# Patient Record
Sex: FEMALE | Race: WHITE
[De-identification: names, ages, dates, MRNs, and addresses within clinical notes are randomized per-mention and may not be internally consistent; named-entity substitution may affect disease eponyms.]

---

## 2018-03-07 ENCOUNTER — HOSPITAL ENCOUNTER (EMERGENCY)
Dept: HOSPITAL 25 - ED | Age: 83
Discharge: ANOTHER HOSPITAL | End: 2018-03-07
Payer: MEDICARE

## 2018-03-07 VITALS — SYSTOLIC BLOOD PRESSURE: 166 MMHG | DIASTOLIC BLOOD PRESSURE: 100 MMHG

## 2018-03-07 DIAGNOSIS — K92.2: Primary | ICD-10-CM

## 2018-03-07 DIAGNOSIS — Z87.19: ICD-10-CM

## 2018-03-07 DIAGNOSIS — Z88.2: ICD-10-CM

## 2018-03-07 LAB
BASOPHILS # BLD AUTO: 0.1 10^3/UL (ref 0–0.2)
EOSINOPHIL # BLD AUTO: 0.2 10^3/UL (ref 0–0.6)
HCT VFR BLD AUTO: 39 % (ref 35–47)
HGB BLD-MCNC: 13.2 G/DL (ref 12–16)
INR PPP/BLD: 0.91 (ref 0.77–1.02)
LYMPHOCYTES # BLD AUTO: 1.9 10^3/UL (ref 1–4.8)
MCH RBC QN AUTO: 29 PG (ref 27–31)
MCHC RBC AUTO-ENTMCNC: 34 G/DL (ref 31–36)
MCV RBC AUTO: 86 FL (ref 80–97)
MONOCYTES # BLD AUTO: 0.8 10^3/UL (ref 0–0.8)
NEUTROPHILS # BLD AUTO: 5.5 10^3/UL (ref 1.5–7.7)
NRBC # BLD AUTO: 0 10^3/UL
NRBC BLD QL AUTO: 0.1
PLATELET # BLD AUTO: 246 10^3/UL (ref 150–450)
RBC # BLD AUTO: 4.52 10^6/UL (ref 4–5.4)
WBC # BLD AUTO: 8.4 10^3/UL (ref 3.5–10.8)

## 2018-03-07 PROCEDURE — 80053 COMPREHEN METABOLIC PANEL: CPT

## 2018-03-07 PROCEDURE — 85610 PROTHROMBIN TIME: CPT

## 2018-03-07 PROCEDURE — 85025 COMPLETE CBC W/AUTO DIFF WBC: CPT

## 2018-03-07 PROCEDURE — 36415 COLL VENOUS BLD VENIPUNCTURE: CPT

## 2018-03-07 PROCEDURE — 86901 BLOOD TYPING SEROLOGIC RH(D): CPT

## 2018-03-07 PROCEDURE — 96360 HYDRATION IV INFUSION INIT: CPT

## 2018-03-07 PROCEDURE — 99283 EMERGENCY DEPT VISIT LOW MDM: CPT

## 2018-03-07 PROCEDURE — 86900 BLOOD TYPING SEROLOGIC ABO: CPT

## 2018-03-07 PROCEDURE — 86850 RBC ANTIBODY SCREEN: CPT

## 2018-03-07 PROCEDURE — 96361 HYDRATE IV INFUSION ADD-ON: CPT

## 2018-03-07 NOTE — ED
[place] NGO Julia, scribed for Ismael De Jesus MD on 03/07/18 at 1914 .





GI/ HPI





- HPI Summary


HPI Summary: 





This patient is a 82 year old F presenting to [facility] accompanied by her daughter 

with a chief complaint of four bouts of rectal bleeding beginning at 17:45 3/7/

18. She states the first time was dark red blood with bright red blood since. 

Patient denies abdominal pain, dizziness, and changes in bowel habits. She had 

a colon resection in 2008 and has a history of diverticulitis. She is not on 

blood thinners.





- History of Current Complaint


Chief Complaint: EDGIBleed


Stated Complaint: RECTAL BLEEDING


Hx Obtained From: Patient


Onset/Duration: Started Hours Ago


Timing: Constant


Vaginal Bleeding Description: Bright Red


Pain Intensity: 0


Location of Pain: None


Associated Signs and Symptoms: Positive: Other: - no bowel changes.  Negative: 

Dizziness, Abdominal Pain





- Allergy/Home Medications


Allergies/Adverse Reactions: 


 Allergies











Allergy/AdvReac Type Severity Reaction Status Date / Time


 


Sulfa (Sulfonamide Allergy  See Comment Verified 03/07/18 19:18





Antibiotics)     











Home Medications: 


 Home Medications





L.acidoph,Paracasei, B.lactis [Probiotic] 1 each PO DAILY 03/07/18 [History 

Confirmed 03/07/18]


Multivitamins/Minerals TAB* [Theragran/minerals TAB*] 1 tab PO DAILY 03/07/18 [

History Confirmed 03/07/18]











PMH/Surg Hx/FS Hx/Imm Hx


Endocrine/Hematology History: 


   Denies: Hx Diabetes


Cardiovascular History: 


   Denies: Hx Hypertension, Hx Pacemaker/ICD


Sensory History: 


   Denies: Hx Hearing Aid


Psychiatric History: 


   Denies: Hx Panic Disorder





- Surgical History


Surgery Procedure, Year, and Place: RIGHT HIP.  CATARACTS.  RT LEG SURGERY WITH 

NADIRA AND 7 SCREWS


Infectious Disease History: No


Infectious Disease History: 


   Denies: Traveled Outside the [place] in Last 30 Days





- Family History


Known Family History: 


   Negative: Cardiac Disease, Diabetes





- Social History


Occupation: Retired





Review of Systems


Gastrointestinal: Other - rectal bleeding


Positive: Other - bowel changes.  Negative: Abdominal Pain


Neurological: Negative - dizziness


All Other Systems Reviewed And Are Negative: Yes





Physical Exam





- Summary


Physical Exam Summary: 





Appearance: Well appearing, no pain distress


Skin: warm, dry, reflects adequate perfusion


Head/face: normal


Eyes: EOMI, RUBY


ENT: normal


Neck: supple, non-tender


Respiratory: CTA, breath sounds present


Cardiovascular: RRR, pulses symmetrical 


Abdomen: non-tender, soft


Bowel: present


Musculoskeletal: normal, strength/ROM intact


Neuro: normal, sensory motor intact, A&Ox3


Rectal Exam: bright red blood per rectum, no rectal masses





Triage Information Reviewed: Yes


Vital Signs On Initial Exam: 


 Initial Vitals











Temp Pulse Resp BP Pulse Ox


 


 97.6 F   106   17   191/91   95 


 


 03/07/18 18:25  03/07/18 18:25  03/07/18 18:25  03/07/18 18:25  03/07/18 18:25











Vital Signs Reviewed: Yes





Diagnostics





- Vital Signs


 Vital Signs











  Temp Pulse Resp BP Pulse Ox


 


 03/07/18 18:25  97.6 F  106  17  191/91  95














- Laboratory


Lab Results: 


 Lab Results











  03/07/18 03/07/18 03/07/18 Range/Units





  19:27 19:27 19:27 


 


WBC  8.4    (3.5-10.8)  10^3/ul


 


RBC  4.52    (4.0-5.4)  10^6/ul


 


Hgb  13.2    (12.0-16.0)  g/dl


 


Hct  39    (35-47)  %


 


MCV  86    (80-97)  fL


 


MCH  29    (27-31)  pg


 


MCHC  34    (31-36)  g/dl


 


RDW  14    (10.5-15)  %


 


Plt Count  246    (150-450)  10^3/ul


 


MPV  8    (7.4-10.4)  um3


 


Neut % (Auto)  65.1    (38-83)  %


 


Lymph % (Auto)  22.8 L    (25-47)  %


 


Mono % (Auto)  9.1 H    (0-7)  %


 


Eos % (Auto)  1.9    (0-6)  %


 


Baso % (Auto)  1.1    (0-2)  %


 


Absolute Neuts (auto)  5.5    (1.5-7.7)  10^3/ul


 


Absolute Lymphs (auto)  1.9    (1.0-4.8)  10^3/ul


 


Absolute Monos (auto)  0.8    (0-0.8)  10^3/ul


 


Absolute Eos (auto)  0.2    (0-0.6)  10^3/ul


 


Absolute Basos (auto)  0.1    (0-0.2)  10^3/ul


 


Absolute Nucleated RBC  0    10^3/ul


 


Nucleated RBC %  0.1    


 


INR (Anticoag Therapy)    0.91  (0.77-1.02)  


 


Sodium   137   (133-145)  mmol/L


 


Potassium   4.2   (3.5-5.0)  mmol/L


 


Chloride   102   (101-111)  mmol/L


 


Carbon Dioxide   28   (22-32)  mmol/L


 


Anion Gap   7   (2-11)  mmol/L


 


BUN   25 H   (6-24)  mg/dL


 


Creatinine   1.42 H   (0.51-0.95)  mg/dL


 


Est GFR ( Amer)   45.8   (>60)  


 


Est GFR (Non-Af Amer)   35.6   (>60)  


 


BUN/Creatinine Ratio   17.6   (8-20)  


 


Glucose   109 H   ()  mg/dL


 


Calcium   9.5   (8.6-10.3)  mg/dL


 


Total Bilirubin   0.30   (0.2-1.0)  mg/dL


 


AST   18   (13-39)  U/L


 


ALT   11   (7-52)  U/L


 


Alkaline Phosphatase   76   ()  U/L


 


Total Protein   7.5   (6.4-8.9)  g/dL


 


Albumin   4.3   (3.2-5.2)  g/dL


 


Globulin   3.2   (2-4)  g/dL


 


Albumin/Globulin Ratio   1.3   (1-3)  


 


Blood Type     


 


Antibody Screen     














  03/07/18 Range/Units





  19:27 


 


WBC   (3.5-10.8)  10^3/ul


 


RBC   (4.0-5.4)  10^6/ul


 


Hgb   (12.0-16.0)  g/dl


 


Hct   (35-47)  %


 


MCV   (80-97)  fL


 


MCH   (27-31)  pg


 


MCHC   (31-36)  g/dl


 


RDW   (10.5-15)  %


 


Plt Count   (150-450)  10^3/ul


 


MPV   (7.4-10.4)  um3


 


Neut % (Auto)   (38-83)  %


 


Lymph % (Auto)   (25-47)  %


 


Mono % (Auto)   (0-7)  %


 


Eos % (Auto)   (0-6)  %


 


Baso % (Auto)   (0-2)  %


 


Absolute Neuts (auto)   (1.5-7.7)  10^3/ul


 


Absolute Lymphs (auto)   (1.0-4.8)  10^3/ul


 


Absolute Monos (auto)   (0-0.8)  10^3/ul


 


Absolute Eos (auto)   (0-0.6)  10^3/ul


 


Absolute Basos (auto)   (0-0.2)  10^3/ul


 


Absolute Nucleated RBC   10^3/ul


 


Nucleated RBC %   


 


INR (Anticoag Therapy)   (0.77-1.02)  


 


Sodium   (133-145)  mmol/L


 


Potassium   (3.5-5.0)  mmol/L


 


Chloride   (101-111)  mmol/L


 


Carbon Dioxide   (22-32)  mmol/L


 


Anion Gap   (2-11)  mmol/L


 


BUN   (6-24)  mg/dL


 


Creatinine   (0.51-0.95)  mg/dL


 


Est GFR ( Amer)   (>60)  


 


Est GFR (Non-Af Amer)   (>60)  


 


BUN/Creatinine Ratio   (8-20)  


 


Glucose   ()  mg/dL


 


Calcium   (8.6-10.3)  mg/dL


 


Total Bilirubin   (0.2-1.0)  mg/dL


 


AST   (13-39)  U/L


 


ALT   (7-52)  U/L


 


Alkaline Phosphatase   ()  U/L


 


Total Protein   (6.4-8.9)  g/dL


 


Albumin   (3.2-5.2)  g/dL


 


Globulin   (2-4)  g/dL


 


Albumin/Globulin Ratio   (1-3)  


 


Blood Type  B Positive  


 


Antibody Screen  Negative  











Result Diagrams: 


 03/07/18 19:27





 03/07/18 19:27


Lab Statement: Any lab studies that have been ordered have been reviewed, and 

results considered in the medical decision making process.





GIGU Course/Dx





- Course


Course Of Treatment: Patient presents with four bouts of rectal bleeding with 

bright red blood since 17:45 today. Rectal exam reveals bright red blood 

without masses. Bloodowork is unremarkable. Patient is given IV fluids. Due to 

lack of Gastrointestinal coverage patient will be transferred for a higher 

level of care.





- Diagnoses


Provider Diagnoses: 


 Lower GI bleed, History of diverticulitis








Discharge





- Discharge Plan


Condition: Fair


Disposition: TRANS [facility] OF [facility] FAC


Discharge Disposition Comment: transfer to [facility] for GI evaluation 


Referrals: 


Abraham Roberson MD [Primary Care Provider] - 





The documentation as recorded by the [place] waters Julia accurately reflects 

the service I personally performed and the decisions made by me, Ismael De Jesus MD.

## 2019-03-30 ENCOUNTER — HOSPITAL ENCOUNTER (OUTPATIENT)
Dept: HOSPITAL 25 - ED | Age: 84
Setting detail: OBSERVATION
LOS: 1 days | Discharge: HOME | End: 2019-03-31
Attending: INTERNAL MEDICINE | Admitting: INTERNAL MEDICINE
Payer: MEDICARE

## 2019-03-30 DIAGNOSIS — Z96.641: ICD-10-CM

## 2019-03-30 DIAGNOSIS — N39.0: Primary | ICD-10-CM

## 2019-03-30 DIAGNOSIS — K57.92: ICD-10-CM

## 2019-03-30 DIAGNOSIS — Z79.82: ICD-10-CM

## 2019-03-30 DIAGNOSIS — I10: ICD-10-CM

## 2019-03-30 DIAGNOSIS — Z88.2: ICD-10-CM

## 2019-03-30 DIAGNOSIS — J44.9: ICD-10-CM

## 2019-03-30 DIAGNOSIS — Z87.891: ICD-10-CM

## 2019-03-30 DIAGNOSIS — A41.9: ICD-10-CM

## 2019-03-30 LAB
ALBUMIN SERPL BCG-MCNC: 4.6 G/DL (ref 3.2–5.2)
ALBUMIN/GLOB SERPL: 1.4 {RATIO} (ref 1–3)
ALP SERPL-CCNC: 84 U/L (ref 34–104)
ALT SERPL W P-5'-P-CCNC: 13 U/L (ref 7–52)
ANION GAP SERPL CALC-SCNC: 11 MMOL/L (ref 2–11)
APTT PPP: 27.3 SECONDS (ref 26–36.3)
AST SERPL-CCNC: 19 U/L (ref 13–39)
BASOPHILS # BLD AUTO: 0 10^3/UL (ref 0–0.2)
BUN SERPL-MCNC: 16 MG/DL (ref 6–24)
BUN/CREAT SERPL: 18.4 (ref 8–20)
CALCIUM SERPL-MCNC: 9.6 MG/DL (ref 8.6–10.3)
CHLORIDE SERPL-SCNC: 99 MMOL/L (ref 101–111)
EOSINOPHIL # BLD AUTO: 0 10^3/UL (ref 0–0.6)
GLOBULIN SER CALC-MCNC: 3.3 G/DL (ref 2–4)
GLUCOSE SERPL-MCNC: 190 MG/DL (ref 70–100)
HCO3 SERPL-SCNC: 24 MMOL/L (ref 22–32)
HCT VFR BLD AUTO: 45 % (ref 33–41)
HGB BLD-MCNC: 14.9 G/DL (ref 12–16)
INR PPP/BLD: 0.94 (ref 0.77–1.02)
LYMPHOCYTES # BLD AUTO: 0.9 10^3/UL (ref 1–4.8)
MCH RBC QN AUTO: 29 PG (ref 27–31)
MCHC RBC AUTO-ENTMCNC: 33 G/DL (ref 31–36)
MCV RBC AUTO: 86 FL (ref 80–97)
MONOCYTES # BLD AUTO: 0.8 10^3/UL (ref 0–0.8)
NEUTROPHILS # BLD AUTO: 12.2 10^3/UL (ref 1.5–7.7)
NRBC # BLD AUTO: 0 10^3/UL
NRBC BLD QL AUTO: 0
PLATELET # BLD AUTO: 256 10^3/UL (ref 150–450)
POTASSIUM SERPL-SCNC: 3.5 MMOL/L (ref 3.5–5)
PROT SERPL-MCNC: 7.9 G/DL (ref 6.4–8.9)
RBC # BLD AUTO: 5.21 10^6 /UL (ref 3.7–4.87)
RBC UR QL AUTO: (no result)
SODIUM SERPL-SCNC: 134 MMOL/L (ref 135–145)
WBC # BLD AUTO: 13.9 10^3/UL (ref 3.5–10.8)
WBC UR QL AUTO: (no result)

## 2019-03-30 PROCEDURE — 85025 COMPLETE CBC W/AUTO DIFF WBC: CPT

## 2019-03-30 PROCEDURE — 81003 URINALYSIS AUTO W/O SCOPE: CPT

## 2019-03-30 PROCEDURE — 87040 BLOOD CULTURE FOR BACTERIA: CPT

## 2019-03-30 PROCEDURE — 87086 URINE CULTURE/COLONY COUNT: CPT

## 2019-03-30 PROCEDURE — 85610 PROTHROMBIN TIME: CPT

## 2019-03-30 PROCEDURE — 80048 BASIC METABOLIC PNL TOTAL CA: CPT

## 2019-03-30 PROCEDURE — 87186 SC STD MICRODIL/AGAR DIL: CPT

## 2019-03-30 PROCEDURE — 87077 CULTURE AEROBIC IDENTIFY: CPT

## 2019-03-30 PROCEDURE — 99283 EMERGENCY DEPT VISIT LOW MDM: CPT

## 2019-03-30 PROCEDURE — G0378 HOSPITAL OBSERVATION PER HR: HCPCS

## 2019-03-30 PROCEDURE — 81015 MICROSCOPIC EXAM OF URINE: CPT

## 2019-03-30 PROCEDURE — 36415 COLL VENOUS BLD VENIPUNCTURE: CPT

## 2019-03-30 PROCEDURE — 83605 ASSAY OF LACTIC ACID: CPT

## 2019-03-30 PROCEDURE — 96361 HYDRATE IV INFUSION ADD-ON: CPT

## 2019-03-30 PROCEDURE — 80053 COMPREHEN METABOLIC PANEL: CPT

## 2019-03-30 PROCEDURE — 96365 THER/PROPH/DIAG IV INF INIT: CPT

## 2019-03-30 PROCEDURE — 86140 C-REACTIVE PROTEIN: CPT

## 2019-03-30 PROCEDURE — 85730 THROMBOPLASTIN TIME PARTIAL: CPT

## 2019-03-30 RX ADMIN — SODIUM CHLORIDE SCH MLS/HR: 900 IRRIGANT IRRIGATION at 13:52

## 2019-03-30 NOTE — XMS REPORT
Continuity of Care Document (CCD)

 Created on:2019



Patient:Annika Phelan

Sex:Female

:1935

External Reference #:[id number]





Demographics







 Address  [address] [place] [address]. [address]



   [place] [address]

 

 Home Phone  [phone]

 

 Preferred Language  en

 

 Marital Status  Declined to Specify/Unknown

 

 Sabianist Affiliation  Unknown

 

 Race  White

 

 Ethnic Group  Declined to Specify/Unknown









Author







 Name  Abraham Roberson MD

 

 Address  [address]



   Unavailable



   [place] [address]









Support







 Name  Relationship  Address  Phone

 

 Lidya Scherer  Daughter  Unavailable  [phone]









Care Team Providers







 Name  Role  Phone

 

 Abraham Roberson MD  Care Team Information   Unavailable









Payers







 Date  Identification Numbers  Payment Provider  Subscriber

 

 Expires: 2015  Policy Number: [id number]  [facility]  Annika Phelan









 Group Number: [id number]  [address] 

 

 PayID: [id number]  [place] [address]









 Effective: 2016  Policy Number: [id number]  [employer]  Annika Phelan









 PayID: [id number]  [address]









 [place] [address]







Advance Directives







 Description

 

 No Information Available







Problems







 Date  Description  Provider  Status

 

 Onset: 2014  Benign essential hypertension  Nica Griggs M.D., R.D.  
Active







Family History







 Date  Family Member(s)  Observation  Comments

 

   Father   due to Cancer  ()

 

   Mother   due to "Old Age"  ()

 

   Children  3  

 

   Children  daughters  

 

   Siblings  5 brothers and 1 sister  

 

   Siblings  4 of 6  

 

   First Brother   due to Cancer  ()

 

   Second Brother  Hypertension  







Social History







 Type  Date  Description  Comments

 

 Birth Sex    Unknown  

 

 Marital Status      

 

 Diet    Healthy, Well Balanced  Chicken and vegetables



       often.  Drinks milk



       BID.

 

 Tobacco Use  Start: Unknown  current cigarette smoker  Smoked a carton/week



       before  sick.



       Now a carton/3 weeks.



       Started smoking at age



       34.  About a half a



       pack a day still as of



       2011.

 

 Cigarette Use    Quit - Age 76  Cold turkey after she



       broke her hip.

 

 ETOH Use    Rarely consumes alcohol  

 

 Tobacco Use  Start: Unknown  Patient is a current  



     smoker, smokes every day  

 

 Exercise Type/Frequency    Exercises regularly  in her living room.



       Walks in her mobile



       home.  Does PT



       exercises for her legs.



       







Allergies, Adverse Reactions, Alerts







 Date  Description  Reaction  Status  Severity  Comments

 

 2011  Sulfa    Active    







Medications







 Medication  Date  Status  Form  Strength  Qnty  SIG  Indications  Ordering



                 Provider

 

 Hydrochlorothiazid    Active  Tablets  25mg  30tab  1 by mouth  I10  
Abraham



 e  /        s  every day    MD Karol

 

 Aerochamber Plus    Active  Misc    1unit  use as  J44.9          s  directed    Shelley,



             with    NP



             inhalers    

 

 Proair HFA    Active  Aerosol  108(90Bas  8.5un  Inhale 1-2    Nica      e)  its  Puffs Every    lv Griggs/Act    6 Hours as    M.D.,



             Needed.    R.D.

 

 Acidophilus    Active  Tablets          Nica



 Probiotic  /              BRIGID Griggs.,



                 R.D.

 

 Acetaminophen    Active        prn pain    Nica



   /              GAIL Griggs,



                 R.D.

 

 Multivitamin &    Active  Liquid          Nica



 Mineral  /              BRIGID Griggs.,



                 R.D.

 

 Incruse Ellipta    Active  Aerosol  62.5mcg/I    Inhale One    Unknown



         nh    puff By    



             Mouth Every    



             Day For    



             Chronic    



             Obstructive    



             Lung    



             Disease    

 

                 

 

 Incruse Ellipta  06/15  Hx  Aerosol  62.5mcg/I  60uni  inhale one          nh  ts  puff by    Karol



   -          mouth every    , MD



             day for    



   /2018          chronic    



             obstructive    



             lung    



             disease    

 

 Spiriva Respimat    Hx  Aerosol  2.5mcg/Ac  8gm  Inhale 2  J44.9        t    puffs by    Shortle,



   -          mouth daily    NP



             for chronic    



   /2019          obstructive    



             lung    



             disease    

 

 Spacer, Pediatric    Hx      1unit  to use with  J44.9          s  inhalers    Shelley,



   -              NP



                 

 

 Cipro    Hx  Tablets  500mg  20tab  1 pill by  R19.7  Shaw        s  mouth twice    R. Storm,



   -          a day for    FNP-C



   06/15          10 days    



   /2017              

 

 Striverdi Respimat    Hx  Aerosol  2.5mcg/Ac  4gm  Inhale 2  J44.9  Abraham



   /2017      t    puffs by    Skipetdionne



   -          mouth daily    , MD



   06/15          for chronic    



   /2017          obstructive    



             lung    



             disease    

 

 Zyrtec Allergy    Hx  Tablets  10mg  30tab  1 by mouth  R05          s  every day    Karol



   -              , MD



                 

 

 Colace    Hx  Capsules  100mg  30cap  2 tab by  K59.00          s  mouth daily    Karol



   -          for a week    , MD



                 

 

 Amoxicillin    Hx  Tablets  875mg  20tab  1 tablet  785.6          s  bid x 10    Debbie,



   -          days    FNP-C



                 

 

 Neomycin/Polymyxin    Hx  Solution  3.5-05840  1bott  3 drops in  380.22  
Shawnti



 /  /      -1  le  right ear    RYung James,



   -          qid for 5    FNP-C



             days           until    



             resolved    

 

 Ciprofloxacin    Hx    250mg  14uni  1 po bid            Emma Ricci M.D.,



                 R.D.



   /              

 

 Hydrocortisone/Ace    Hx  Solution    QS  As directed    Nica



 tic Acid Otic  /              Mara Griggs M.D.,



                 R.D.



   /              

 

 Calcium + D    Hx  Tablets  600-200mg    1 po qd          -Unit        Emma Griggs M.D.,



                 R.D.



   /              

 

 Aspir-81    Hx  Tablets  81mg  30tab  1 po qd        Emma Huang M.D.,



                 R.D.



   /              

 

 Vitamin B-12    Hx  Tablets  1000mcg    1 po qd        Emma Sierra M.D.,



                 R.D.



   /              

 

 Vitamin C    Hx  Chewtabs  250mg                      Emma Griggs M.D.,



                 R.D.



   /              

 

 Hydrochlorothiazid    Hx  Tablets  25mg  45tab  /  po qd            Emma Nazario M.D.,



                 R.D.



   /              

 

 Stiolto Respimat    Hx  Aerosol  2.5-2.5mc      J44.9  Unknown



   /0000      g/Act        



   -              



   06/15              



   /2017              







Immunizations







 CPT Code  Status  Date  Vaccine  Lot #

 

 [id number]  Given  10/01/2003  Pneumovax 23 (PPSV23) 65+ years or high risk 2 to 64
  



       year old  









 









 [id number]  Refused  2017  Influenza Virus Vaccine, Quadrivalent, 3 Yr > Quad
,  



       Preserv Free  







Vital Signs







 Date  Vital  Result  Comment

 

 2019  9:19am  Weight  132.00 lb  









 Weight  59.875 kg  

 

 BP Systolic  150 mmHg  

 

 BP Diastolic  80 mmHg  

 

 Heart Rate  84 /min  

 

 Body Temperature  97.7 F  

 

 Respiratory Rate  20 /min  









 2018  2:18pm  Weight  130.00 lb  









 Weight  58.968 kg  

 

 BP Systolic  160 mmHg  

 

 BP Diastolic  72 mmHg  

 

 Heart Rate  72 /min  

 

 Body Temperature  98.2 F  

 

 Respiratory Rate  16 /min  









 2018  4:15pm  Weight  129.00 lb  









 Weight  58.514 kg  

 

 BP Systolic  168 mmHg  

 

 BP Diastolic  92 mmHg  

 

 Heart Rate  100 /min  

 

 Body Temperature  98.7 F  

 

 Respiratory Rate  16 /min  

 

 O2 % BldC Oximetry  94 %  









 2018  9:03am  Weight  128.00 lb  









 Weight  58.061 kg  

 

 BP Systolic  160 mmHg  

 

 BP Diastolic  80 mmHg  

 

 Heart Rate  88 /min  

 

 Body Temperature  97.3 F  

 

 Respiratory Rate  15 /min  

 

 O2 % BldC Oximetry  98 %  









 06/15/2017  2:26pm  Weight  126.00 lb  









 Weight  57.154 kg  

 

 BP Systolic  146 mmHg  

 

 BP Diastolic  78 mmHg  

 

 Heart Rate  92 /min  

 

 Body Temperature  98.4 F  

 

 Respiratory Rate  24 /min  

 

 O2 % BldC Oximetry  92 %  









 2017  3:46pm  Weight  127.00 lb  









 Weight  57.607 kg  

 

 BP Systolic  130 mmHg  

 

 BP Diastolic  68 mmHg  

 

 Heart Rate  91 /min  

 

 Body Temperature  99.1 F  

 

 Respiratory Rate  24 /min  

 

 O2 % BldC Oximetry  93 %  









 2017 11:46am  Weight  129.00 lb  









 Weight  58.514 kg  

 

 BP Systolic  148 mmHg  

 

 BP Diastolic  88 mmHg  

 

 Heart Rate  98 /min  

 

 Body Temperature  99.3 F  

 

 Respiratory Rate  18 /min  

 

 O2 % BldC Oximetry  97 %  









 2017  2:51pm  Weight  129.00 lb  









 Weight  58.514 kg  

 

 BP Systolic  170 mmHg  

 

 BP Diastolic  82 mmHg  

 

 Heart Rate  104 /min  

 

 Body Temperature  99.3 F  

 

 O2 % BldC Oximetry  92 %  97 after treatment









 2016 10:45am  Weight  129.00 lb  









 Weight  58.514 kg  

 

 BP Systolic  148 mmHg  

 

 BP Diastolic  78 mmHg  

 

 Heart Rate  98 /min  

 

 Body Temperature  98.2 F  

 

 Respiratory Rate  17 /min  

 

 O2 % BldC Oximetry  98 %  









 2016 11:26am  Weight  129.00 lb  









 Weight  58.514 kg  

 

 BP Systolic  150 mmHg  

 

 BP Diastolic  93 mmHg  

 

 Heart Rate  96 /min  

 

 Body Temperature  99.7 F  

 

 O2 % BldC Oximetry  96 %  









 2014  9:25am  Weight  132.00 lb  









 Weight  59.875 kg  

 

 BP Systolic  166 mmHg  

 

 BP Diastolic  78 mmHg  

 

 Heart Rate  116 /min  

 

 Height  63.5 inches  5'3.50"

 

 BMI (Body Mass Index)  23.0 kg/m2  









 2013  2:52pm  Weight  130.00 lb  









 Weight  58.968 kg  

 

 BP Systolic  130 mmHg  

 

 BP Diastolic  80 mmHg  

 

 Heart Rate  88 /min  

 

 Body Temperature  98.6 F  









 2013  4:50pm  Weight  131.00 lb  









 Weight  59.422 kg  

 

 BP Systolic  162 mmHg  

 

 BP Diastolic  68 mmHg  

 

 Heart Rate  88 /min  

 

 Body Temperature  99.9 F  









 2012  2:27pm  Weight  116.00 lb  









 Weight  52.618 kg  

 

 BP Systolic  120 mmHg  

 

 BP Diastolic  72 mmHg  

 

 Heart Rate  80 /min  

 

 Body Temperature  99.0 F  









 2011  1:20pm  Weight  111.00 lb  









 Weight  50.350 kg  

 

 BP Systolic  130 mmHg  

 

 BP Diastolic  60 mmHg  

 

 Heart Rate  96 /min  

 

 Height  63.5 inches  5'3.50"

 

 BMI (Body Mass Index)  19.4 kg/m2  









 2011 10:51am  Weight  113.00 lb  









 Weight  51.257 kg  

 

 BP Systolic  144 mmHg  

 

 BP Diastolic  50 mmHg  

 

 Heart Rate  96 /min  

 

 Height  63.5 inches  5'3.50"

 

 BMI (Body Mass Index)  19.7 kg/m2  







Results







 Test  Date  Facility  Test  Result  H/L  Range  Note

 

 Laboratory test  2019  [facility]  Vitamin B12  <
pending>      



 finding    [phone]          









 Folic Acid (Folate)  <pending>      









 Laboratory test  2018  [facility]  Hemoglobin  11.1      



 finding    [phone]   -          

 

 CBC Auto Diff  2018  [facility]  White Blood Count
  8.4 10^3/uL  N  3.5-10.  1



     [phone]        8  









 Red Blood Count  4.52 10^6/uL  N  4.0-5.4  

 

 Hemoglobin  13.2 g/dL  N  12.0-16.0  

 

 Hematocrit  39 %  N  35-47  

 

 Mean Corpuscular Volume  86 fL  N  80-97  

 

 Mean Corpuscular Hemoglobin  29 pg  N  27-31  

 

 Mean Corpuscular HGB Conc  34 g/dL  N  31-36  

 

 Red Cell Distribution Width  14 %  N  10.5-15  

 

 Platelet Count  246 10^3/uL  N  150-450  

 

 Mean Platelet Volume  8 um3  N  7.4-10.4  

 

 Abs Neutrophils  5.5 10^3/uL  N  1.5-7.7  

 

 Abs Lymphocytes  1.9 10^3/uL  N  1.0-4.8  

 

 Abs Monocytes  0.8 10^3/uL  N  0-0.8  

 

 Abs Eosinophils  0.2 10^3/uL  N  0-0.6  

 

 Abs Basophils  0.1 10^3/uL  N  0-0.2  

 

 Abs Nucleated RBC  0 10^3/uL      

 

 Granulocyte %  65.1 %  N  38-83  

 

 Lymphocyte %  22.8 %  Low  25-47  

 

 Monocyte %  9.1 %  High  0-7  

 

 Eosinophil %  1.9 %  N  0-6  

 

 Basophil %  1.1 %  N  0-2  

 

 Nucleated Red Blood Cells %  0.1      









 Inr/Protime  2018  [facility]  Inr  0.91  N  0.77-
1.02  



     [phone]          

 

 Type & Screen  2018  [facility]  Patient Blood  B 
Positive      



     [phone]  Type        









 Antibody Screen  NEGATIVE      









 Comp Metabolic Panel  2018  [facility]  Sodium  
137 mmol/L  N  133-145  



     [phone]          









 Potassium  4.2 mmol/L  N  3.5-5.0  

 

 Chloride  102 mmol/L  N  101-111  

 

 Co2 Carbon Dioxide  28 mmol/L  N  22-32  

 

 Anion Gap  7 mmol/L  N  2-11  

 

 Glucose  109 mg/dL  High    

 

 Blood Urea Nitrogen  25 mg/dL  High  6-24  

 

 Creatinine  1.42 mg/dL  High  0.51-0.95  

 

 BUN/Creatinine Ratio  17.6  N  8-20  

 

 Calcium  9.5 mg/dL  N  8.6-10.3  

 

 Total Protein  7.5 g/dL  N  6.4-8.9  

 

 Albumin  4.3 g/dL  N  3.2-5.2  

 

 Globulin  3.2 g/dL  N  2-4  

 

 Albumin/Globulin Ratio  1.3  N  1-3  

 

 Total Bilirubin  0.30 mg/dL  N  0.2-1.0  

 

 Alkaline Phosphatase  76 U/L  N    

 

 Alt  11 U/L  N  7-52  

 

 Ast  18 U/L  N  13-39  

 

 Egfr Non-  35.6    >60  

 

 Egfr   45.8    >60  2









 Laboratory test  2017  [facility]  Fecal 
Lactoferrin  SEE RESULT      3



 finding    [phone]  (Stool WBC)  BELOW      









 C Difficile PCR  SEE RESULT BELOW      4









 Stool Panel ([facility])  2017  [facility]  Stool Culture
  <pending>      



     [phone]          









 Fecal Lactoferrin (Stool WBC)  <pending>      

 

 O&P Ova & Parasites Screen  <pending>      

 

 C Difficile PCR  <pending>      









 Stool Panel  2016  [facility]  Stool For  SEE 
RESULT      5



 ([facility])    [phone]  Blood  BELOW      

 

 CBC Auto Diff  2014  [facility]  White Blood  6.7 
10^3/uL    4.8-10.8  



     [phone]  Count        









 Red Blood Count  5.20 10^6/uL    4.0-5.4  

 

 Hemoglobin  15.3 g/dL    12.0-16.0  

 

 Hematocrit  45 %    35-47  

 

 Mean Corpuscular Volume  87 fL    80-97  

 

 Mean Corpuscular Hemoglobin  29 pg    27-31  

 

 Mean Corpuscular HGB Conc  34 g/dL    31-36  

 

 Red Cell Distribution Width  14 %    10.5-15  

 

 Platelet Count  270 10^3/uL    150-450  

 

 Mean Platelet Volume  9 um3    7.4-10.4  

 

 Abs Neutrophils  4.2 10^3/uL    1.5-7.7  

 

 Abs Lymphocytes  1.8 10^3/uL    1.0-4.8  

 

 Abs Monocytes  0.6 10^3/uL    0-0.8  

 

 Abs Eosinophils  0.1 10^3/uL    0-0.6  

 

 Abs Basophils  0.1 10^3/uL    0-0.2  

 

 Abs Nucleated RBC  0 10^3/uL      

 

 Granulocyte %  62.4 %    38-83  

 

 Lymphocyte %  27.2 %    25-47  

 

 Monocyte %  8.6 %    1-9  

 

 Eosinophil %  0.9 %    0-6  

 

 Basophil %  0.9 %    0-2  

 

 Nucleated Red Blood Cells %  0.1      









 Comp Metabolic Panel  2014  [facility]  Sodium  
137 mmol/L    133-145  



     [phone]          









 Potassium  3.9 mmol/L    3.5-5.0  

 

 Chloride  101 mmol/L    101-111  

 

 Co2 Carbon Dioxide  27.0 mmol/L    22-32  

 

 Anion Gap  9.0 mmol/L    2-11  

 

 Glucose  96 mg/dL      

 

 Blood Urea Nitrogen  12 mg/dL    6-24  

 

 Creatinine  0.70 mg/dL    0.50-1.40  

 

 BUN/Creatinine Ratio  17.1    8-20  

 

 Calcium  10.5 mg/dL  High  8.1-9.9  

 

 Total Protein  7.6 g/dL    6.2-8.1  

 

 Albumin  4.8 g/dL    3.2-5.2  

 

 Globulin  2.8 g/dL    2-4  

 

 Albumin/Globulin Ratio  1.7    1-3  

 

 Total Bilirubin  0.5 mg/dL    0.4-1.5  

 

 Alkaline Phosphatase  72 U/L      

 

 Alt  16 U/L    14-54  

 

 Ast  24 U/L    12-42  

 

 Egfr Non-  81.4    >60  

 

 Egfr   104.6    >60  6









 Lipid Profile  2014  [facility]  Triglycerides  
137 mg/dL      



 (Trig/Chol/HDL)    [phone]          









 Cholesterol  265 mg/dL  High  Less than 200  

 

 HDL Cholesterol  75 mg/dL  High  40-60  7

 

 Cholesterol/HDL Ratio  3.5 Average    1-4.44  

 

 LDL Cholesterol  162.6  High  Less Than 100  8









 Urine DIP  2014  [facility]  Leukocytes  NEG    Neg  



     ([phone]   -          









 Urine Nitrites  NEG    Neg  

 

 Urine pH  5    5-6  

 

 Total Protein, Urine  NEG    Neg  

 

 Urine Glucose  NORM    Norm  

 

 Urine Ketones  NEG    Neg  

 

 Urobilinogen  NORM    Norm  

 

 Urine Bilirubin  NEG    Neg  

 

 Urine Blood  NEG    Neg  

 

 Specific Gravity  1.020    1.01-1.02  









 PT W/Inr  2012  [facility]  Inr  0.94    0.88-1.13
  9



     [phone]          









 Protime  11.1 SEC    10.3-13.5  10









 Laboratory test  2012  [facility]  PTT (Aptt)  
23.2 SEC  Low  25.1-38.5  



 finding    [phone]          

 

 Comp Metabolic  2012  [facility]  Sodium  136 mmol/
L    135-145  



 Panel    [phone]          









 Potassium  3.5 mmol/L    3.5-5.0  

 

 Chloride  101 mmol/L    101-111  

 

 Co2 (Carbon Dioxide)  25.0 mmol/L    22-32  

 

 Anion Gap  10.0 mmol/L    2-11  11

 

 Glucose  109 mg/dL  High    

 

 BUN  16 mg/dL    6-24  

 

 Creatinine  0.6 mg/dL    0.50-1.40  

 

 One Over Creatinine  1.66      

 

 BUN/Creatinine Ratio  26.7  High  8-20  

 

 Calcium  8.5 mg/dL    8.1-9.9  

 

 Total Protein  6.5 GM/DL    6.2-8.1  

 

 Albumin  3.5 GM/DL    3.2-5.2  

 

 Globulin  3.0 GM/DL    2-4  

 

 Albumin/Globulin Ratio  1.2    1-3  

 

 Bilirubin Total  0.7 mg/dL    0.4-1.5  12

 

 Alkaline Phosphatase  78 U/L      

 

 Alt (SGPT)  17 U/L    14-54  

 

 Ast (Sgot)  22 U/L    12-42  

 

 eGFR Non-  97.7    > 60  

 

 eGFR   125.7    > 60  13









 Laboratory test  2012  [facility]  Lipase  19 U/L  
Low  22-51  



 finding    [phone]          

 

 CBC Auto Diff  2012  [facility]  White Blood  10.0 
CUMM    4.8-10.8  



     [phone]  Count        









 Red Cell Count  3.81 CUMM  Low  4.2-5.4  

 

 Hemoglobin  11.5 g/dL  Low  12.0-16.0  

 

 Hematocrit  33 %  Low  35-47  

 

 Mean Corpuscular Volume  87 um3    79-97  

 

 Mean Corpuscular Hemoglob  30 pg    27-31  

 

 Mean Corpuscular HGB Cone  35 g/dL    32-36  

 

 Redcell Distribution WDTH  14 %    10.5-15  

 

 Platelet Count  274 CUMM    150-450  

 

 Mean Platelet Volume  7.8 um3    7.4-10.4  14









 Manual Differential  2012  [facility]  
Polysegmented  79 %    38-83  



     [phone]  Neutrophil        









 Lymphocyte  12 %  Low  25-47  

 

 Monocyte  9 %    0-13  

 

 Absolute Neutrophil Count  7.9      

 

 RBC Morphology  NORMAL      









 Urinalysis  2012  [facility]  Ua Color  YELLOW    
Yellow  



 W/Microscopic    [phone]          









 Appearance-Urine  CLEAR    Clear  

 

 Specific Gravity-Ur  1.005  Low  1.010-1.030  

 

 Esterase-Urine  NEGATIVE    Negative  

 

 Nitrite  NEGATIVE    Negative  

 

 Urobilinogen-Ur-DIP  NEGATIVE    Negative  

 

 Protein-Urine  NEGATIVE    Negative  

 

 PH-Urine  6.5    5-9  

 

 Blood-Urine  TRACE  Abnormal  Negative  

 

 Ketones-Urine  NEGATIVE    Negative  

 

 Bilirubin-Ur  NEGATIVE    Negative  

 

 Glucose-Urine  NEGATIVE    Negative  

 

 WBC-Urine  0-2    0-5  

 

 RBC-Urine  RARE    0-2  

 

 Epith Cells-Ur  SMALL    None  

 

 Bacteria-Urine  1+    None  









 Laboratory test  2011  [facility]  PTT (Aptt)  
22.3  Low  25.15-38.53  



 finding    [phone]          

 

 CBC Auto Diff  2011  [facility]  White Blood  13.7 
CUMM  High  4.8-10.8  



     [phone]  Count        









 Red Cell Count  4.27 CUMM    4.2-5.4  

 

 Hemoglobin  13.2 g/dL    12.0-16.0  

 

 Hematocrit  38 %    35-47  

 

 Mean Corpuscular Volume  90 um3    79-97  

 

 Mean Corpuscular Hemoglob  31 pg    27-31  

 

 Mean Corpuscular HGB Cone  34 g/dL    32-36  

 

 Redcell Distribution WDTH  13 %    10.5-15  

 

 Platelet Count  251 CUMM    150-450  

 

 Mean Platelet Volume  8.1 um3    7.4-10.4  15









 Manual  2011  [facility]  Polysegmented  91 %  
High  38-83  



 Differential    [phone]  Neutrophil        









 Lymphocyte  7 %  Low  25-47  

 

 Monocyte  2 %    0-13  

 

 Absolute Neutrophil Count  12.4      

 

 RBC Morphology  NORMAL      









 PT W/Inr  2011  [facility]  Inr  0.98    0.88-1.13
  16



     [phone]          









 Protime  11.5 SEC    10.3-13.5  17









 Urinalysis  2011  [facility]  Ua Color  YELLOW    
Yellow  



 W/Microscopic    [phone]          









 Appearance-Urine  CLEAR    Clear  

 

 Specific Gravity-Ur  1.023    1.010-1.030  

 

 Esterase-Urine  2+  Abnormal  Negative  

 

 Nitrite  NEGATIVE    Negative  

 

 Urobilinogen-Ur-DIP  NEGATIVE    Negative  

 

 Protein-Urine  NEGATIVE    Negative  

 

 PH-Urine  6.0    5-9  

 

 Blood-Urine  NEGATIVE    Negative  

 

 Ketones-Urine  NEGATIVE    Negative  

 

 Bilirubin-Ur  NEGATIVE    Negative  

 

 Glucose-Urine  NEGATIVE    Negative  

 

 WBC-Urine  15-20  Abnormal  0-5  

 

 RBC-Urine  0-2    0-2  

 

 Epith Cells-Ur  FEW    None  

 

 Bacteria-Urine  TRACE    None  









 Sensitivities For Urine  2011  [facility]  
Ampicillin  >=32  R    



 Culture    [phone]          









 Amikacin  <=2  S    

 

 Ciprofloxacin  <=0.25  S    

 

 Ceftriaxone  <=1  S    

 

 Cefazolin  16  I    

 

 Nitrofurantoin  <=16  S    

 

 Gentamicin  <=1  S    

 

 Imipenem  <=1  S    

 

 Levofloxacin  <=0.12  S    

 

 Trimeth-Sulfa  <=20  S    

 

 Ceftazidime  <=1  S    

 

 Tigecycline  <=0.5  S    

 

 Piperacillin/Tazobactam KB  29  S    









 CBC Auto Diff  2011  [facility]  White Blood  8.1 
CUMM    4.8-10.8  18



     [phone]  Count        









 Red Cell Count  4.73 CUMM    4.2-5.4  

 

 Hemoglobin  14.4 g/dL    12.0-16.0  

 

 Hematocrit  43 %    35-47  

 

 Mean Corpuscular Volume  90 um3    79-97  

 

 Mean Corpuscular Hemoglob  31 pg    27-31  

 

 Mean Corpuscular HGB Cone  34 g/dL    32-36  

 

 Redcell Distribution WDTH  14 %    10.5-15  

 

 Platelet Count  251 CUMM    150-450  

 

 Mean Platelet Volume  8.7 um3    7.4-10.4  

 

 Gran %  64.6 %    38-83  

 

 Lymph %  26.3 %    25-47  

 

 Mononuclear %  7.7 %    1-9  

 

 Eosinophil %  0.7 %    0-6  

 

 Basophil %  0.7 %    0-2  

 

 Abs Lymphs  2.1    1.0-4.8  

 

 Abs Mononuclear  0.6    0-0.8  

 

 Absolute Neutrophil Count  5.3    1.5-7.7  

 

 Abs Eosinophils  0.1    0-0.6  

 

 Abs Basophils  0.1    0-0.2  









 Comp Metabolic Panel  2011  [facility]  Sodium  
140 mmol/L    135-145  



     [phone]          









 Potassium  3.6 mmol/L    3.5-5.0  

 

 Chloride  104 mmol/L    101-111  

 

 Co2 (Carbon Dioxide)  29.0 mmol/L    22-32  

 

 Anion Gap  7.0 mmol/L    2-11  19

 

 Glucose  70 mg/dL      

 

 BUN  17 mg/dL    6-24  

 

 Creatinine  0.80 mg/dL    0.50-1.40  

 

 One Over Creatinine  1.20      

 

 BUN/Creatinine Ratio  21.3  High  8-20  

 

 Calcium  9.3 mg/dL    8.1-9.9  

 

 Total Protein  7.2 GM/DL    6.2-8.1  

 

 Albumin  4.4 GM/DL    3.2-5.2  

 

 Globulin  2.8 GM/DL    2-4  

 

 Albumin/Globulin Ratio  1.6    1-3  

 

 Bilirubin Total  0.6 mg/dL    0.4-1.5  20

 

 Alkaline Phosphatase  76 U/L      

 

 Alt (SGPT)  14 U/L    14-54  

 

 Ast (Sgot)  21 U/L    12-42  

 

 eGFR Non-  70.3    > 60  

 

 eGFR   90.4    > 60  21









 Laboratory  2011  [facility]  TSH  1.04 MIU/ML    
0.34-5.60  



 test finding    [phone]          

 

 Urine Culture  2011  [facility]  Urine Culture  
ESCHERICHIA      22



 & Sensitivi    [phone]  Sensitivi  COLI      

 

 Lipid Profile  2011  [facility]  Triglyceride  110 
mg/dL      



 (Trig/Chol/HD    [phone]          



 L)              









 Cholesterol  207 mg/dL  High  Less Than 200  23

 

 High Density Lipoprotein  58 mg/dL    40-60  24

 

 Cholesterol/HDL Ratio  3.57 AVERAGE    1-4.44  

 

 Low Density Lipoprotein  127 mg/dL  High  Less Than 100  25









 Urine DIP  2011  [facility]  Leukocytes  ++    Neg  



     ([phone]   -          









 Urine Nitrites  NEG    Neg  

 

 Urine pH  5    5-6  

 

 Total Protein, Urine  TRACE    Neg  

 

 Urine Glucose  NORM    Norm  

 

 Urine Ketones  NEG    Neg  

 

 Urobilinogen  NORM    Norm  

 

 Urine Bilirubin  NEG    Neg  

 

 Urine Blood  TRACE    Neg  

 

 Specific Gravity  NA  Low  1.01-1.02  









 1  RECTAL BLEEDING

 

 2  *******Because ethnic data is not always readily available,



   this report includes an eGFR for both -Americans and



   non- Americans.****



   The National Kidney Disease Education Program (NKDEP) does



   not endorse the use of the MDRD equation for patients that



   are not between the ages of 18 and 70, are pregnant, have



   extremes of body size, muscle mass, or nutritional status,



   or are non- or non-.



   According to the National Kidney Foundation, irrespective of



   diagnosis, the stage of the disease is based on the level of



   kidney function:



   Stage Description                      GFR(mL/min/1.73 m(2))



   1     Kidney damage with normal or decreased GFR       90



   2     Kidney damage with mild decrease in GFR          60-89



   3     Moderate decrease in GFR                         30-59



   4     Severe decrease in GFR                           15-29



   5     Kidney failure                       <15 (or dialysis)

 

 3  SEE RESULT BELOW



   -----------------------------------------------------------------------------
---------------



   Name:  ANNIKA PHELAN                 : 1935    Attend Dr: John James NP



   Acct:  [id number]  Unit: [id number]  AGE: 81            Location:  [facility]



   Re17                        SEX: F             Status:    REG REF



   -----------------------------------------------------------------------------
---------------



   



   SPEC: [id number]         DANUTA:       SUBM DR: John James NP



   REQ:  [id number]              RECD:   



   STATUS: RES



   _



   SOURCE: STOOL          SPDESC:



   ORDERED:  CYung keane PCR, Stool Culture, Fecal Lactoferr, Occult Bl, Scn, O P: 
Baron



   



   -----------------------------------------------------------------------------
---------------



   Procedure                         Result                         Reported   
        Site



   -----------------------------------------------------------------------------
---------------



   Stool Culture



   PENDING



   



   Stool Specimen Description



   PENDING



   



   Shiga Toxin 1   2



   PENDING



   



   C. difficile PCR



   PENDING



   



   Fecal Lactoferrin (Stool WBC)  Final                             17-
1606      ML



   Fecal Lactoferrin           Positive by Immunoassay



   



   TEST LIMITATIONS:



   



   Assay detects elevated levels of lactoferrin released from



   fecal leukocytes as a marker of intestinal inflammation. The



   test may not be appropriate in immunocompromised persons.



   Fecal samples from breast fed infants should not be used



   with this assay.



   



   Stool Occult Blood (1)  Final                                    17-
1610      ML



   Stool Occult Blood          Positive



   



   Collection Date (1)         17



   



   



   



   ** CONTINUED ON NEXT PAGE **



   



   * ML=Testing performed at [facility]



   DEPARTMENT OF PATHOLOGY,  [address] [place] [address]



   Phone # [phone]      Fax #[phone]



   Francesco Camejo M.D. Director     [facility] # [id number]



   



   



   



   -----------------------------------------------------------------------------
---------------



   Patient: ANNIKA PHELAN                  [id number]     (Continued)



   -----------------------------------------------------------------------------
---------------



   



   



   Specimen: [id number]    Collected:   Received: 17-
    (Continued)



   



   -----------------------------------------------------------------------------
---------------



   Procedure                         Result                         Reported   
        Site



   -----------------------------------------------------------------------------
---------------



   O P: Giardia/Cryptospor Screen



   PENDING



   



   -----------------------------------------------------------------------------
---------------



   * ML - MAIN LAB ([facility])



   .



   



   



   



   



   



   



   



   



   



   



   



   



   



   



   



   



   



   



   



   



   



   



   



   



   



   



   



   



   



   



   



   



   



   ** END OF REPORT **



   



   * ML=Testing performed at Main Lab



   DEPARTMENT OF PATHOLOGY,  [address] [place] [address]



   Phone # [phone]      Fax #[phone]



   Francesco Camejo M.D. Director     [facility] # [id number]

 

 4  SEE RESULT BELOW



   -----------------------------------------------------------------------------
---------------



   Name:  ANNIKA PHELAN                 : 1935    Attend Dr: John James NP



   Acct:  [id number]  Unit: [id number]  AGE: 81            Location:  [facility]



   Re17                        SEX: F             Status:    REG REF



   -----------------------------------------------------------------------------
---------------



   



   SPEC: [id number]         DANUTA:       EDNA DR: oJhn James NP



   REQ:  [id number]              RECD:   



   STATUS: COMP



   _



   SOURCE: STOOL          SPDESC:



   ORDERED:  C. diff PCR, Stool Culture, Fecal Lactoferr, Occult Bl, Scn, O P: 
Baron



   



   -----------------------------------------------------------------------------
---------------



   Procedure                         Result                         Reported   
        Site



   -----------------------------------------------------------------------------
---------------



   Stool Culture  Final                                             17-
1050      ML



   



   Result                      No enteric pathogens isolated



   



   Testing for Salmonella, Shigella, Aeromonas, Plesiomonas,



   Yersinia and Campylobacter are included in a Stool Culture.



   



   Vibrio spp not routinely tested for in a stool culture.



   If testing is desired, please request specifically when



   placing test order.



   



   Sensitivities not routinely performed on stool isolates, as



   antibiotics may prolong the carriage rate of bacteria.



   Please contact the microbiology lab if sensitivities are



   required.



   



   Stool Specimen Description  Final                                17-
1633      ML



   Stool Color                 Brown



   Stool Form                  Nonformed



   Stool Consistency           Soft



   



   Shiga Toxin 1   2  Final                                         17-
0920      ML



   



   Organism 1                     Negative Shiga Toxin 1   2



   



   Immunochromatographic Assay



   



   



   



   



   ** CONTINUED ON NEXT PAGE **



   



   * ML=Testing performed at Main Lab



   DEPARTMENT OF PATHOLOGY,  [address] [place] [address]



   Phone # [phone]      Fax #[phone]



   Francesco Camejo M.D. Director     [facility] # [id number]



   



   



   



   -----------------------------------------------------------------------------
---------------



   Patient: ANNIKA PHELAN                  [id number]     (Continued)



   -----------------------------------------------------------------------------
---------------



   



   



   Specimen: [id number]    Collected:   Received: 
    (Continued)



   



   -----------------------------------------------------------------------------
---------------



   Procedure                         Result                         Reported   
        Site



   -----------------------------------------------------------------------------
---------------



   Shiga Toxin 1   2  Final   (continued)                           920



   C. difficile PCR  Final                                          17-
1631      ML



   Organism 1                     027 Presumptive NEGATIVE



   Organism 2                     Toxigenic C.diff NEGATIVE



   



   Fecal Lactoferrin (Stool WBC)  Final                             17-
1606      ML



   Fecal Lactoferrin           Positive by Immunoassay



   



   TEST LIMITATIONS:



   



   Assay detects elevated levels of lactoferrin released from



   fecal leukocytes as a marker of intestinal inflammation. The



   test may not be appropriate in immunocompromised persons.



   Fecal samples from breast fed infants should not be used



   with this assay.



   



   Stool Occult Blood (1)  Final                                    17-
1610      ML



   Stool Occult Blood          Positive



   



   Collection Date (1)         17



   



   O P: Giardia/Cryptospor Screen  Final                            17-
1114      ML



   



   Organism 1                     Neg Cryptosporidium/Giardia



   



   Giardia and cryptosporidium antigen testing performed by



   enzyme immunoassay.  If patient is immunocompromised or has



   traveled to or is from a developing country, a full ova and



   parasite exam with microscopic (OPMIC) is recommended.  All



   samples will be held one month in case full ova and parasite



   testing is requested.  Contact the Microbiology Department



   at [phone].



   



   TEST LIMITATIONS:



   As with all diagnostic procedures, the results obtained



   should be used in conjunction with other clinical



   information available the physician, including confirmation



   



   ** CONTINUED ON NEXT PAGE **



   



   * ML=Testing performed at Main Lab



   DEPARTMENT OF PATHOLOGY,  [address] [place] [address]



   Phone # [phone]      Fax #[phone]



   Francesco Camejo M.D. Director     [facility] # [id number]



   



   



   



   -----------------------------------------------------------------------------
---------------



   Patient: ANNIKA PHELAN                  [id number]     (Continued)



   -----------------------------------------------------------------------------
---------------



   



   



   Specimen: [id number]    Collected:   Received: 
    (Continued)



   



   -----------------------------------------------------------------------------
---------------



   Procedure                         Result                         Reported   
        Site



   -----------------------------------------------------------------------------
---------------



   O P: Giardia/Cryptospor Screen  Final   (continued)              
111



   by another method.  Negative results can occur in samples



   containing antigen below lower limits of detection of the



   assay.  One negative specimen does not rule out the



   possibility of a parasitic infection. To improve detection



   it is recommended that three specimens be collected on



   separate days over a period of not more than seven



   days. The use of colonic washes, aspirates or other diluted



   sample types has not been established and could affect the



   performance of the assay. Stool samples contaminated with an



   oily or particulate base (eg. Barium, mineral oil etc.)



   could interfere with the test and are not recommended.



   



   -----------------------------------------------------------------------------
---------------



   * ML - [facility] ([facility])



   .



   



   



   



   



   



   



   



   



   



   



   



   



   



   



   



   



   



   



   



   



   



   



   



   ** END OF REPORT **



   



   * ML=Testing performed at Main Lab



   DEPARTMENT OF PATHOLOGY,  [address] [place] [address]



   Phone # [phone]      Fax #[phone]



   Francesco Camejo M.D. Director     [facility] # [id number]

 

 5  SEE RESULT BELOW



   -----------------------------------------------------------------------------
---------------



   Name:  ANNIKA PHELAN                 : 1935    Attend Dr: Abraham Roberson MD



   Acct:  [id number]  Unit: [id number]  AGE: 81            Location:  [facility]



   Re16                        SEX: F             Status:    REG REF



   -----------------------------------------------------------------------------
---------------



   



   SPEC: [id number]         DANUTA:       SUBM DR: Abraham Roberson MD



   REQ:  [id number]              RECD:   [id number]



   STATUS: COMP



   _



   SOURCE: STOOL          SPDESC:



   ORDERED:  Hemoccult/R, C. diff PCR/S, Stool Culture/R, Fecal Lactoferr/R, O P
: Magy



   



   -----------------------------------------------------------------------------
---------------



   Procedure                         Result                         Reported   
        Site



   -----------------------------------------------------------------------------
---------------



   Stool Culture  Final                                             16-
1118      ML



   



   Result                      No enteric pathogens isolated



   



   Testing for Salmonella, Shigella, Aeromonas, Plesiomonas,



   Yersinia and Campylobacter are included in a Stool Culture.



   



   Vibrio spp not routinely tested for in a stool culture.



   If testing is desired, please request specifically when



   placing test order.



   



   Sensitivities not routinely performed on stool isolates, as



   antibiotics may prolong the carriage rate of bacteria.



   Please contact the microbiology lab if sensitivities are



   required.



   



   Stool Specimen Description  Final                                16-
0853      ML



   Stool Color                 Brown



   Stool Form                  Formed



   Stool Consistency           Firm



   



   Shiga Toxin 1   2  Final                                         16-
0822      ML



   



   Organism 1                     Negative Shiga Toxin 1   2



   



   Immunochromatographic Assay



   



   



   



   



   ** CONTINUED ON NEXT PAGE **



   



   * ML=Testing performed at Main Lab



   DEPARTMENT OF PATHOLOGY,  [address] [place] [address]



   Phone # [phone]      Fax #[phone]



   Francesco Camejo M.D. Director     [facility] # [id number]



   



   



   



   -----------------------------------------------------------------------------
---------------



   Patient: ANNIKA PHELAN                  [id number]     (Continued)



   -----------------------------------------------------------------------------
---------------



   



   



   Specimen: [id number]    Collected:   Received: 16-
    (Continued)



   



   -----------------------------------------------------------------------------
---------------



   Procedure                         Result                         Reported   
        Site



   -----------------------------------------------------------------------------
---------------



   Shiga Toxin 1   2  Final   (continued)                           16-
822



   C. difficile PCR  Final                                          16-
      ML



   Organism 1                     027 Presumptive NEGATIVE



   Organism 2                     Toxigenic C.diff NEGATIVE



   



   Fecal Lactoferrin (Stool WBC)  Final                             16-
1246      ML



   Fecal Lactoferrin           Positive by Immunoassay



   



   TEST LIMITATIONS:



   



   Assay detects elevated levels of lactoferrin released from



   fecal leukocytes as a marker of intestinal inflammation. The



   test may not be appropriate in immunocompromised persons.



   Fecal samples from breast fed infants should not be used



   with this assay.



   



   Stool Occult Blood  Final                                        16-
1939      ML



   Stool Occult Blood          Negative



   



   O P: Giardia/Cryptospor Screen  Final                            16-
42      ML



   



   Organism 1                     Neg Cryptosporidium/Giardia



   



   Giardia and cryptosporidium antigen testing performed by



   enzyme immunoassay.  If patient is immunocompromised or has



   traveled to or is from a developing country, a full ova and



   parasite exam with microscopic (OPMIC) is recommended.  All



   samples will be held one month in case full ova and parasite



   testing is requested.  Contact the Microbiology Department



   at [phone].



   



   TEST LIMITATIONS:



   As with all diagnostic procedures, the results obtained



   should be used in conjunction with other clinical



   information available the physician, including confirmation



   by another method.  Negative results can occur in samples



   containing antigen below lower limits of detection of the



   



   ** CONTINUED ON NEXT PAGE **



   



   * ML=Testing performed at Main Lab



   DEPARTMENT OF PATHOLOGY,  [address] [place] [address]



   Phone # [phone]      Fax #[phone]



   Francesco Camejo M.D. Director     [facility] # [id number]



   



   



   



   -----------------------------------------------------------------------------
---------------



   Patient: ANNIKA PHELAN                  [id number]     (Continued)



   -----------------------------------------------------------------------------
---------------



   



   



   Specimen: [id number]    Collected:   Received: 
    (Continued)



   



   -----------------------------------------------------------------------------
---------------



   Procedure                         Result                         Reported   
        Site



   -----------------------------------------------------------------------------
---------------



   O P: Giardia/Cryptospor Screen  Final   (continued)              842



   assay.  One negative specimen does not rule out the



   possibility of a parasitic infection. To improve detection



   it is recommended that three specimens be collected on



   separate days over a period of not more than seven



   days. The use of colonic washes, aspirates or other diluted



   sample types has not been established and could affect the



   performance of the assay. Stool samples contaminated with an



   oily or particulate base (eg. Barium, mineral oil etc.)



   could interfere with the test and are not recommended.



   



   -----------------------------------------------------------------------------
---------------



   * ML - MAIN LAB ([facility])



   .



   



   



   



   



   



   



   



   



   



   



   



   



   



   



   



   



   



   



   



   



   



   



   



   



   



   ** END OF REPORT **



   



   * ML=Testing performed at Main Lab



   DEPARTMENT OF PATHOLOGY,  [address] [place] [address]



   Phone # [phone]      Fax #[phone]



   Francesco Camejo M.D. Director     [facility] # [id number]

 

 6  *******Because ethnic data is not always readily available,



   this report includes an eGFR for both -Americans and



   non- Americans.****



   The National Kidney Disease Education Program (NKDEP) does



   not endorse the use of the MDRD equation for patients that



   are not between the ages of 18 and 70, are pregnant, have



   extremes of body size, muscle mass, or nutritional status,



   or are non- or non-.



   According to the National Kidney Foundation, irrespective of



   diagnosis, the stage of the disease is based on the level of



   kidney function:



   Stage Description                      GFR(mL/min/1.73 m(2))



   1     Kidney damage with normal or decreased GFR       90



   2     Kidney damage with mild decrease in GFR          60-89



   3     Moderate decrease in GFR                         30-59



   4     Severe decrease in GFR                           15-29



   5     Kidney failure                       <15 (or dialysis)

 

 7  HDL Interpretation:



   Undesirable: High Risk:  Less than 40 mg/dL



   Desirable:  Low Risk:  Greater than 60 mg/dL

 

 8  LDL Interpretation:



   Low Risk Optimal Level:  LDL Less than 100 mg/dL



   Near or Above Optimal:  -129 mg/dL



   Borderline High Risk:  -159 mg/dL



   High Risk:  -189 mg/dL



   Very High Risk:  LDL Greater than 189 mg/dL

 

 9  Recommended INR for Patients



   on Oral Anticoagulants



   Prophylaxis                       2.0 - 3.0



   Treatment of thrombosis           2.0 - 3.0



   Prevention of embolism            2.0 - 3.0



   Prevention of embolism from



   prosthetic heart valves         2.5 - 3.5

 

 10  DIAGNOSIS,TREATMENT,AND THERAPY MUST BE BASED ON THE INR



   VALUE ALONE.

 

 11  Anion gap measurement may be of limited value in the



   presence of any alkalosis, especially in a combined acid



   base disorder.



   .

 

 12  A metabolite of Naproxen, O-desmethylnaproxen, has been



   shown to interfere with the Jendrassik-Gregg method for



   measuring total bilirubin.  Samples from patients who have



   taken Naproxen have shown spurious elevation in total



   bilirubin levels.

 

 13  *******Because ethnic data is not always readily available,



   this report includes an eGFR for both -Americans and



   non- Americans.****



   The National Kidney Disease Education Program (NKDEP) does



   not endorse the use of the MDRD equation for patients that



   are not between the ages of 18 and 70, are pregnant, have



   extremes of body size, muscle mass, or nutritional status,



   or are non- or non-.



   According to the National Kidney Foundation, irrespective of



   diagnosis, the stage of the disease is based on the level of



   kidney function:



   Stage Description                      GFR(mL/min/1.73 m(2))



   1     Kidney damage with normal or decreased GFR       90



   2     Kidney damage with mild decrease in GFR          60-89



   3     Moderate decrease in GFR                         30-59



   4     Severe decrease in GFR                           15-29



   5     Kidney failure                       <15 (or dialysis)

 

 14  Lymphopenia %



   Basophilia %

 

 15  Neutrophilia %



   Lymphopenia %

 

 16  Recommended INR for Patients



   on Oral Anticoagulants



   Prophylaxis                       2.0 - 3.0



   Treatment of thrombosis           2.0 - 3.0



   Prevention of embolism            2.0 - 3.0



   Prevention of embolism from



   prosthetic heart valves         2.5 - 3.5

 

 17  DIAGNOSIS,TREATMENT,AND THERAPY MUST BE BASED ON THE INR



   VALUE ALONE.

 

 18  NON FASTING

 

 19  Anion gap measurement may be of limited value in the



   presence of any alkalosis, especially in a combined acid



   base disorder.



   .

 

 20  A metabolite of Naproxen, O-desmethylnaproxen, has been



   shown to interfere with the Jendrassik-Roxbury method for



   measuring total bilirubin.  Samples from patients who have



   taken Naproxen have shown spurious elevation in total



   bilirubin levels.

 

 21  *******Because ethnic data is not always readily available,



   this report includes an eGFR for both -Americans and



   non- Americans.****



   The National Kidney Disease Education Program (NKDEP) does



   not endorse the use of the MDRD equation for patients that



   are not between the ages of 18 and 70, are pregnant, have



   extremes of body size, muscle mass, or nutritional status,



   or are non- or non-.



   According to the National Kidney Foundation, irrespective of



   diagnosis, the stage of the disease is based on the level of



   kidney function:



   Stage Description                      GFR(mL/min/1.73 m(2))



   1     Kidney damage with normal or decreased GFR       90



   2     Kidney damage with mild decrease in GFR          60-89



   3     Moderate decrease in GFR                         30-59



   4     Severe decrease in GFR                           15-29



   5     Kidney failure                       <15 (or dialysis)

 

 22  100^,000 ORGANISMS/ML (MANY)^CCU

 

 23  CHOLESTEROL INTERPRETATION:



   Desirable:  Less than 200 MG/DL



   Borderline-High Risk:  200-239 MG/DL



   High-Risk:  240 MG/DL and over

 

 24  HDL INTERPRETATION:



   Undesirable: High Risk:  Less than 40 MG/DL



   Desirable:  Low Risk:  Greater than 60 MG/DL

 

 25  LDL INTERPRETATION:



   Low Risk Optimal Level:  LDL Less than 100 MG/DL



   Near or Above Optimal:  -129 MG/DL



   Borderline High Risk:  -159 MG/DL



   High Risk:  -189 MG/DL



   Very High Risk:  LDL Greater than 189 MG/DL







Procedures







 Date  Code  Description  Status

 

 2017  48409  Spirometry-Bronchospasm Evaluation, Before & After  
Completed



     Bronchodilator  

 

 2014  30315  Removal-Impacted Cerumen  Completed

 

 2012  30492  Removal-Impacted Cerumen  Completed

 

 2011  03218  Removal-Impacted Cerumen  Completed

 

 2009  [id number]  Mammogram  Completed

 

 2008  [id number]  Colonoscopy  Completed







Encounters







 Type  Date  Location  Provider  Dx  Diagnosis

 

 Office Visit  2018  Main Office  HARISH Marie44.9  Chronic 
obstructive



   2:15p    MD    pulmonary disease,



           unspecified

 

 Office Visit  2018  Main Office  ENEDELIA Velez.9  Chronic 
obstructive



   4:30p    NP    pulmonary disease,



           unspecified

 

 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 
lg int w



   9:00a    MD    perforation and



           abscess w bleeding









 I10  Essential (primary) hypertension









 Office Visit  06/15/2017  2:15p  Main Office  Abraham Roberson  R19.7  
Diarrhea,



       MD    unspecified









 J44.9  Chronic obstructive pulmonary disease, unspecified









 Office Visit  2017  3:45p  Main Office  John MUHAMMAD  R19.7  Diarrhea,



       Storm, FNP-C    unspecified

 

 Office Visit  2017 11:15a  Main Office  Abraham MOREIRA44.9  Chronic 
obstructive



       MD Karol    pulmonary disease,



           unspecified

 

 Office Visit  2017  3:00p  Main Office  Abraham  R05  Cough



       MD Karol    









 J44.9  Chronic obstructive pulmonary disease, unspecified









 Office Visit  2016 10:45a  Main Office  Abraham Roberson,  K59.00  
Constipation,



       MD    unspecified









 Z12.11  Encounter for screening for malignant neoplasm of colon









 Office Visit  2016 11:00a  Main Office  Ryanne Lewis,  H61.23  
Impacted cerumen,



       FNP-C    bilateral

 

 Office Visit  2014  9:30a  Main Office  Nica Griggs,  V70.0  
Examination



       M.D., R.D.    General Medical



           Routine AT Health



           Care Facility









 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

 

 380.4  Impacted Cerumen









 Office Visit  2013  3:00p  Main Office  Ryanne Lewis,  785.6  Lymph 
Nodes



       FNP-C    Enlargement

 

 Office Visit  2013  4:45p  Main Office  John MUHAMMAD  380.22  Otitis 
Externa



       Storm, FNP-C    Other Acute

 

 Office Visit  2012  2:30p  Main Office  Nica Griggs,  401.1  
Hypertension Benign



       M.D., R.D.    









 783.21  Loss Of Weight

 

 305.1  Tobacco Use Disorder

 

 009.1  Colitis Enteritis & Gastroenteritis Presumed Infectious Orig

 

 380.4  Impacted Cerumen

 

 562.11  Diverticulitis Colon W/O Hemorrhage









 Office Visit  2011  1:30p  Main Office  Nica Griggs,  V70.0  
Examination General



       M.D., R.D.    Medical Routine AT



           Health Care



           Facility









 401.1  Hypertension Benign

 

 783.21  Loss Of Weight

 

 305.1  Tobacco Use Disorder

 

 380.4  Impacted Cerumen

 

 V76.51  Special Screening For Malignant Neoplasms Colon









 Office Visit  2011 10:45a  Main Office  Nica Griggs,  401.1  
Hypertension Benign



       M.DYung, R.D.    









 783.21  Loss Of Weight







Plan of Treatment

2019 - Abraham Roberson MDI10 Essential (primary) hypertensionNew 
Medication:Hydrochlorothiazide 25 mg - 1 by mouth every dayComments:She has 
developed true hypertension in addition to her whitecoat hypertension.In the 
past she was onhydrochlorothiazide without side effects.  We will start her at 
25 mg daily.  That may be need to beadjusted based on her lab results today, 
she is having some leg cramps at night that could be consistent with potassium 
deficiency.R44.1 Visual hallucinationsNew Xrays:CT Brain W/O Contrast, Ordered: 
19Comments:Checking some labs and starting the process for a CT 
today.Symptoms would be fairly consistent with a frontotemporal dementia.Follow 
up:Make sure we have release on file to talk to her daughter.  CT ordered, 
start PA process.

## 2019-03-30 NOTE — ED
GI/ HPI





- HPI Summary


HPI Summary: 


Patient is an 83-year-old female who presents emergency department for fever, 

dysuria and hematuria that started last night.  Past medical history of 

hypertension and patient notes she started HCTZ a few days ago.  Patient states 

she does not take blood pressure medication today.  She denies associated 

symptoms of chest pain, shortness of breath, vomiting, diarrhea, abd. pain, 

flank pain.  Patient resides at home by herself.  Her granddaughter is present 

with her.  Symptoms are moderate in early.  No current modifying factors.








- History of Current Complaint


Chief Complaint: EDUrogenitalProblems


Time Seen by Provider: 03/30/19 09:43


Stated Complaint: PASSING BLOOD PER PT


Hx Obtained From: Patient


Pain Intensity: 0





- Allergy/Home Medications


Allergies/Adverse Reactions: 


 Allergies











Allergy/AdvReac Type Severity Reaction Status Date / Time


 


Sulfa (Sulfonamide Allergy  See Comment Verified 03/30/19 09:25





Antibiotics)     











Home Medications: 


 Home Medications





Aspirin 81 mg PO DAILY 03/30/19 [History Confirmed 03/30/19]


hydroCHLOROthiazide [Hydrochlorothiazide] 25 mg PO DAILY 03/30/19 [History 

Confirmed 03/30/19]











PMH/Surg Hx/FS Hx/Imm Hx


Previously Healthy: Yes


Endocrine/Hematology History: 


   Denies: Hx Diabetes


Cardiovascular History: 


   Denies: Hx Hypertension, Hx Pacemaker/ICD


Sensory History: 


   Denies: Hx Hearing Aid


Psychiatric History: 


   Denies: Hx Panic Disorder





- Surgical History


Surgery Procedure, Year, and Place: RIGHT HIP.  CATARACTS.  RT LEG SURGERY WITH 

NADIRA AND 7 SCREWS


Infectious Disease History: No


Infectious Disease History: 


   Denies: Traveled Outside the [place] in Last 30 Days





- Family History


Known Family History: 


   Negative: Cardiac Disease, Diabetes





- Social History


Occupation: Retired


Lives: Alone


Alcohol Use: None


Substance Use Type: Reports: None


Smoking Status (MU): Former Smoker





Review of Systems


Positive: Fever, Chills


Eyes: Negative


ENT: Negative


Cardiovascular: Negative


Negative: Chest Pain


Respiratory: Negative


Negative: Shortness Of Breath, Cough


Gastrointestinal: Negative


Negative: Abdominal Pain, Vomiting, Diarrhea, Nausea


Positive: burning, frequency, hematuria.  Negative: flank pain


Musculoskeletal: Negative


Skin: Negative


Neurological: Negative


All Other Systems Reviewed And Are Negative: Yes





Physical Exam


Triage Information Reviewed: Yes


Vital Signs On Initial Exam: 


 Initial Vitals











Temp Pulse Resp BP Pulse Ox


 


 100 F   128   20   175/98   94 


 


 03/30/19 09:19  03/30/19 09:19  03/30/19 09:19  03/30/19 09:19  03/30/19 09:19











Vital Signs Reviewed: Yes


Appearance: Positive: Well-Appearing - Pt. sitting up in bed in NAD. Pleasant. 

Talkative. Granddaughter present.


Skin: Positive: Warm, Dry


Head/Face: Positive: Normal Head/Face Inspection


Eyes: Positive: Normal, EOMI, RUBY


Neck: Positive: Supple


Respiratory/Lung Sounds: Positive: Clear to Auscultation, Breath Sounds Present


Cardiovascular: Positive: Normal, RRR


Abdomen Description: Positive: Other: - Abd. is soft with mild tenderness over 

bladder. No CVA tenderness bilaterally.


Musculoskeletal: Positive: Normal, Strength/ROM Intact


Neurological: Positive: Normal, Alert, Oriented to Person Place, Time, CN 

Intact II-III


Psychiatric: Positive: Affect/Mood Appropriate





- Miguel Ángel Coma Scale


Best Eye Response: 4 - Spontaneous


Best Motor Response: 6 - Obeys Commands


Best Verbal Response: 5 - Oriented


Coma Scale Total: 15





Diagnostics





- Vital Signs


 Vital Signs











  Temp Pulse Resp BP Pulse Ox


 


 03/30/19 09:19  100 F  128  20  175/98  94














- Laboratory


Result Diagrams: 


 03/30/19 09:54





 03/30/19 09:53


Lab Statement: Any lab studies that have been ordered have been reviewed, and 

results considered in the medical decision making process.





GIGU Course/Dx





- Course


Course Of Treatment: Patient presenting to the ER with fever, tachycardia and 

urinary symptoms.  Suspect she will meet sepsis criteria.  She was started on 

IV Rocephin for suspected source of urine and 30 cc/kg normal saline.  Labs and 

cultures obtained.  CBC shows a mild leukocytosis.  Lactic acid 3.0.  

Urinalysis is nitrate positive.  Hospitalist was consulted for admission given 

patient meeting sepsis criteria the source of UTI.  I spoke with hospitalist, 

Dr. Galvan, and patient has been accepted to service.





- Diagnoses


Differential Diagnoses - Female: Sepsis, Urinary Tract Infection


Provider Diagnoses: 


 Sepsis, UTI (urinary tract infection)








Discharge





- Sign-Out/Discharge


Documenting (check all that apply): Patient Departure


Patient Received Moderate/Deep Sedation with Procedure: No





- Discharge Plan


Condition: Stable


Disposition: ADMITTED TO [facility] Billing Disposition and Condition


Condition: STABLE


Disposition: Admitted to [facility]

## 2019-03-31 VITALS — SYSTOLIC BLOOD PRESSURE: 154 MMHG | DIASTOLIC BLOOD PRESSURE: 69 MMHG

## 2019-03-31 LAB
ANION GAP SERPL CALC-SCNC: 6 MMOL/L (ref 2–11)
BASOPHILS # BLD AUTO: 0.1 10^3/UL (ref 0–0.2)
BUN SERPL-MCNC: 11 MG/DL (ref 6–24)
BUN/CREAT SERPL: 15.7 (ref 8–20)
CALCIUM SERPL-MCNC: 9.3 MG/DL (ref 8.6–10.3)
CHLORIDE SERPL-SCNC: 104 MMOL/L (ref 101–111)
EOSINOPHIL # BLD AUTO: 0.1 10^3/UL (ref 0–0.6)
GLUCOSE SERPL-MCNC: 99 MG/DL (ref 70–100)
HCO3 SERPL-SCNC: 26 MMOL/L (ref 22–32)
HCT VFR BLD AUTO: 43 % (ref 33–41)
HGB BLD-MCNC: 14.5 G/DL (ref 12–16)
LYMPHOCYTES # BLD AUTO: 2 10^3/UL (ref 1–4.8)
MCH RBC QN AUTO: 29 PG (ref 27–31)
MCHC RBC AUTO-ENTMCNC: 34 G/DL (ref 31–36)
MCV RBC AUTO: 87 FL (ref 80–97)
MONOCYTES # BLD AUTO: 0.8 10^3/UL (ref 0–0.8)
NEUTROPHILS # BLD AUTO: 5.6 10^3/UL (ref 1.5–7.7)
NRBC # BLD AUTO: 0 10^3/UL
NRBC BLD QL AUTO: 0
PLATELET # BLD AUTO: 258 10^3/UL (ref 150–450)
POTASSIUM SERPL-SCNC: 3.8 MMOL/L (ref 3.5–5)
RBC # BLD AUTO: 5.01 10^6 /UL (ref 3.7–4.87)
SODIUM SERPL-SCNC: 136 MMOL/L (ref 135–145)
WBC # BLD AUTO: 8.7 10^3/UL (ref 3.5–10.8)

## 2019-03-31 RX ADMIN — SODIUM CHLORIDE SCH MLS/HR: 900 IRRIGANT IRRIGATION at 01:52

## 2019-03-31 NOTE — DS
CC:  Dr. Roberson *

 

DISCHARGE SUMMARY:

 

DATE OF ADMISSION:  03/30/19

 

DATE OF DISCHARGE:  03/31/19

 

PRIMARY CARE PROVIDER:  Dr. Roberson.

 

ATTENDING PHYSICIAN:  Dr. Laird * (dictated by TAMIKO Villasenor)

 

PRIMARY DIAGNOSES:

1.  Urinary tract infection.

2.  Sepsis.

 

SECONDARY DIAGNOSES:

1.  Hypertension.

2.  Chronic obstructive pulmonary disease.

3.  History of diverticulitis.

 

STUDIES WHILE IN THE HOSPITAL:  Urinalysis revealed positive nitrites, 2+ 
leukocyte esterase, 3+ blood.  Urine culture pending.

 

DISCHARGE MEDICATIONS:  Home medications:

1.  Aspirin 81 mg p.o. daily.

2.  Multivitamin/minerals 1 tab p.o. daily.

3.  Probiotic 1 p.o. daily.

4.  Changed home medication:  Hydrochlorothiazide 12.5 mg p.o. daily.

5.  New home medication:  Cefdinir 300 mg p.o. b.i.d. starting tomorrow x8 days.

 

HISTORY OF PRESENT ILLNESS/HOSPITAL COURSE:  Ms. Phelan is an 83-year-old female 
with a past medical history as described above, who presented to the ER on 03/30
/19 with complaints of hematuria.  She states that she had blood in the urine 
the night prior and in the morning.  She also had associated frequency, urgency
, retention, and burning sensation with urination. She presented to the 
emergency room for further evaluation and was noted to have a low-grade fever, 
leukocytosis, tachycardia and a lactic acidosis of 3.0.  She was also noted to 
have a CRP of 10.62.  Urinalysis revealed source of infection as urinary.  Her 
urine was sent for culture.  Having met sepsis criteria, the patient was 
evaluated and admitted by the hospitalist team.  



Sepsis and lactic acidosis was treated with 3 L of normal saline.  Repeat 
lactic acid showed resolution with a level of 1.2.  Her urinary tract infection 
was treated with Ceftriaxone and her urine was sent for culture, which is still 
pending at the time of discharge.  Throughout her hospital stay, it is noted 
that she had hypertension.  Her hydrochlorothiazide was held throughout the 
hospitalization.  It was halved and restarted on the day of discharge.  The 
following day, the patient states that she is feeling better.  On the day of 
discharge, the patient denies hematuria, urgency, frequency, or retention.  She 
denies pain in the suprapubic region.  She denies shortness of breath, chest 
pain, pain in the calves.  She denies pain in the flanks.  The patient is eager 
to be discharged to home.  Her daughter is concerned that the patient will fall 
while she is trying make it to the bathroom, while the patient states that she 
no longer has urgency and does not rush to the bathroom.  She is safe to go 
home.  It is also noted that she has a steady gait and ambulates well.  My 
recommendation for this is a trial of Depend's for the time being until the 
patient feels that she no longer needs them.

 

PHYSICAL EXAMINATION:  General:  Ms. Phelan is a well-developed, well-nourished 
elderly white woman, who is in no acute distress.  She appears her stated age.  
She appears well and comfortable.  Vital signs are temperature 98.4 orally, 
heart rate 74, respiratory rate 14, oxygen saturation 95% on room air, blood 
pressure 154/69. HEENT:  Visual fields are grossly intact.  Her pupils are 
equally round and reactive to light.  Extraocular movements are intact.  
Sclerae are without icterus. The oral mucous membranes are moist.  Pharynx is 
clear.  Cardiovascular:  Regular rate and rhythm with S1, S2 present.  There 
are no murmurs, rubs, or gallops. There is no JVD.  Respiratory:  Symmetrical 
chest expansion with no use of accessory muscles.  Lungs are clear to 
auscultation.  There are no rhonchi, wheezes, or rubs.  Abdomen:  Bowel sounds 
in all quadrants.  The abdomen is soft. There is no tenderness to palpation 
throughout the abdomen including the suprapubic area.  There is no 
hepatosplenomegaly.  Extremities:  Skin is warm and smooth bilaterally.  There 
is trace edema at bilateral lower extremities.  There is no clubbing or 
cyanosis.  Radial and pedal pulses are palpable.  Neuro:  The patient is awake.
  She is alert and oriented.  She is able to move all of her extremities. She 
has steady gait with no impairments.

 

DISCHARGE PLAN:  Ms. Phelan is stable for discharge.  She will be discharged to 
home.

 

ACTIVITY:  As tolerated.

 

DIET:  Heart healthy, low salt.

 

MEDICATIONS:  As above.

 

EDUCATION:

1.  Follow up with primary care physician in 4 to 7 days.  Discussed recent 
hospitalization due to UTI and sepsis, HCTZ dose, blood pressure/log, urine 
culture, which is pending.

2.  Continue to monitor blood pressure 1 to 2 times daily as described and 
record pressures.

3.  Continue hydrochlorothiazide 12.5 mg daily.

4.  Consider Depend's for a short term if there is concern for urgency and 
making it to the bathroom in time.

5.  Return to the ER or nearest hospital if you experience any worsening of 
symptoms, shortness of breath, lightheadedness, dizziness, chest discomfort, 
high fevers, chills, night sweats, loss of consciousness, or any other 
worrisome signs or symptoms.

 

This is a summarized report of a complex medical history and hospital stay.  
For further details, please see the entire medical record.

 

TIME SPENT:  Approximately 35 minutes was spent on this discharge, greater than 
half of that time was spent face-to-face with the patient discussing discharge 
plans and instructions.

 

____________________________________ 

TAMIKO BOYKIN

 

[id number]/[facility] #: [id number]

CLEOPATRA

## 2019-06-19 ENCOUNTER — HOSPITAL ENCOUNTER (EMERGENCY)
Dept: HOSPITAL 25 - ED | Age: 84
Discharge: HOME | End: 2019-06-19
Payer: MEDICARE

## 2019-06-19 VITALS — SYSTOLIC BLOOD PRESSURE: 170 MMHG | DIASTOLIC BLOOD PRESSURE: 86 MMHG

## 2019-06-19 DIAGNOSIS — M16.11: ICD-10-CM
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
## 2019-06-19 DIAGNOSIS — Z87.891: ICD-10-CM

## 2019-06-19 DIAGNOSIS — S70.01XA: Primary | ICD-10-CM

## 2019-06-19 DIAGNOSIS — M85.88: ICD-10-CM

## 2019-06-19 DIAGNOSIS — S80.11XA: ICD-10-CM

## 2019-06-19 PROCEDURE — 99282 EMERGENCY DEPT VISIT SF MDM: CPT

## 2019-06-19 RX ADMIN — MELOXICAM ONE MG: 7.5 TABLET ORAL at 16:09

## 2019-06-19 RX ADMIN — ACETAMINOPHEN ONE MG: 325 TABLET ORAL at 15:48

## 2019-06-19 NOTE — XMS REPORT
Continuity of Care Document (CCD)

 Created on:May 30, 2019



Patient:Annika Phelan

Sex:Female

:1935

External Reference #:[id number]





Demographics







 Address  [address]



   [place] [address]

 

 Home Phone  [phone]

 

 Mobile Phone  [phone]

 

 Preferred Language  en

 

 Marital Status  Declined to Specify/Unknown

 

 Nondenominational Affiliation  Unknown

 

 Race  White

 

 Ethnic Group  Declined to Specify/Unknown









Author







 Name  Abraham Roberson MD

 

 Address  [address]



   Unavailable



   [place] [address]









Support







 Name  Relationship  Address  Phone

 

 Lidya Scherer  Daughter  Unavailable  [phone]









Care Team Providers







 Name  Role  Phone

 

 Abraham Roberson MD  Care Team Information   Unavailable









Payers







 Date  Identification Numbers  Payment Provider  Subscriber

 

 Expires: 2015  Policy Number: [id number]  [facility]  Annika Phelan









 Group Number: [id number]  [address]

 

 PayID: [id number]  [place] [address]









 Effective: 2016  Policy Number: [id number]  [employer]  Annika Phelan









 PayID: [id number]  [address]









 [place] [address]







Problems







 Active Problems  Provider  Date

 

 Benign essential hypertension  Nica Griggs M.D., R.D.  Onset: 2014







Family History







 Date  Family Member(s)  Observation  Comments

 

   Father   due to Cancer  ()

 

   Mother   due to "Old Age"  ()

 

   Children  3  

 

   Children  daughters  

 

   Siblings  5 brothers and 1 sister  

 

   Siblings  4 of 6  

 

   First Brother   due to Cancer  ()

 

   Second Brother  Hypertension  







Social History







 Type  Date  Description  Comments

 

 Birth Sex    Unknown  

 

 Marital Status      

 

 Diet    Healthy, Well Balanced  Chicken and vegetables



       often.  Drinks milk



       BID.

 

 Tobacco Use  Start: Unknown  current cigarette smoker  Smoked a carton/week



       before  sick.



       Now a carton/3 weeks.



       Started smoking at age



       34.  About a half a



       pack a day still as of



       2011.

 

 Cigarette Use    Quit - Age 76  Cold turkey after she



       broke her hip.

 

 ETOH Use    Rarely consumes alcohol  

 

 Tobacco Use  Start: Unknown  Patient is a current  



     smoker, smokes every day  

 

 Exercise Type/Frequency    Exercises regularly  in her living room.



       Walks in her mobile



       home.  Does PT



       exercises for her legs.



       







Allergies, Adverse Reactions, Alerts







 Active Allergies  Reaction  Severity  Comments  Date

 

 Sulfa        2011







Medications







 Active Medications  SIG  Qnty  Indications  Ordering  Date



         Provider  

 

 Lisinopril  1 by mouth every  90Tablet    Abraham  



      10mg Tablets  day      MD Karol  9



           

 

 Aerochamber Plus  use as directed  1units  J44.9  [facility]  



             Misc  with inhalers      LANIE Rosa  8



           

 

 Proair HFA  Inhale 1-2 Puffs  8.5units    [facility]  



      108(90Base) mcg/Act  Every 6 Hours as      Indu,  7



 Aerosol  Needed.      MCALIXTO., R.D.  

 

 Acidophilus Probiotic        [facility]  



                  Tablets        Indu,  4



         M.D., R.D.  

 

 Acetaminophen  prn pain      [facility]  



         Indu,  2



         M.D., R.D.  

 

 Multivitamin & Mineral        [facility]  



                   Liquid        Indu,  1



         M.D., R.D.  

 

 Incruse Ellipta  Inhale One puff      Unknown  



           62.5mcg/Inh  By Mouth Every        0



 Aerosol  Day For Chronic        



   Obstructive Lung        



   Disease        

 

 Hydrochlorothiazide  take one capsule      Unknown  



               12.5mg  by mouth every        0



 Capsules  morning        









 History Medications









 Hydrochlorothiazide  1 by mouth every  30tabs  I10  Abraham  2019 -



                25mg  day      MD Karol  2019



 Tablets          

 

 Incruse Ellipta  inhale one puff  60units    [facility]  06/15/2018 -



            62.5mcg/Inh  by mouth every      MD Karol  2018



 Aerosol  day for chronic        



   obstructive lung        



   disease        

 

 Spiriva Respimat  Inhale 2 puffs  8gm  J44.9  [facility]  2018 -



             2.5mcg/Act  by mouth daily      LANIE Rosa  2019



 Aerosol  for chronic        



   obstructive lung        



   disease        

 

 Spacer, Pediatric  to use with  1units  J44.9  [facility]  2018 -



   inhalers      LANIE Rosa  2018

 

 Cipro  1 pill by mouth  20tabs  R19.7  Shawnti R.  2017 -



  500mg Tablets  twice a day for      Jacob, FNP-C  06/15/2017



   10 days        

 

 Striverdi Respimat  Inhale 2 puffs  4gm  J44.9  Abraham  2017 -



               2.5mcg/Act  by mouth daily      MD Karol  06/15/2017



 Aerosol  for chronic        



   obstructive lung        



   disease        

 

 Zyrtec Allergy  1 by mouth every  30tabs  R05  Abraham  2017 -



           10mg Tablets  day      MD Karol  2019



           

 

 Colace  2 tab by mouth  30caps  K59.00  Abraham  2016 -



   100mg Capsules  daily for a week      MD Karol  2019



           

 

 Amoxicillin  1 tablet bid x  20tabs  785.6  Ryanne Lewis,  2013 -



        875mg Tablets  10 days      [facility]  2014



           

 

 Neomycin/Polymyxin/HC  3 drops in right  1bottle  380.22  [facility]  2013 -



   ear qid for 5      [facility]  2014



 3.5-78990-8 Solution  days or until        



   resolved        

 

 Ciprofloxacin  1 po bid  14units    Nica Griggs,  2011 -



          250mg        M.D., R.D.  2012



           

 

 Hydrocortisone/Acetic  As directed  QS    Nica Griggs,  2011 -



 Acid Otic Solution        M.D., R.D.  2012



                Solution          



           

 

 Calcium + D  1 po qd      Nica Griggs,  2011 -



        600-200mg-Unit        M.D., R.D.  2018



 Tablets          

 

 Aspir-81  1 po qd  30tabs    Nica Griggs,  2011 -



     81mg Tablets DR REYNOLDS, R.D.  2019



           

 

 Vitamin B-12  1 po qd      Nica Griggs,  2011 -



         1000mcg Tablets        M.D., R.D.  2016



 Sub          

 

 Vitamin C        Nica Griggs,  2011 -



      250mg Chewtabs        M.D., R.D.  2016



           

 

 Hydrochlorothiazide  1/2  po qd  45tabs    Nica Griggs,  2011 -



                25mg        M.D., R.D.  2011



 Tablets          

 

 Stiolto Respimat      J44.9  Unknown   -



           06/15/2017



 2.5-2.5mcg/Act Aerosol          



           

 

 Cefdinir  Take One Capsule      Unknown   -



     300mg Capsules  By Mouth Twice A        2019



   Day        







Immunizations







 CPT Code  Status  Date  Vaccine  Lot #

 

 [id number]  Given  10/01/2003  Pneumovax 23 (PPSV23) 65+ years or high risk 2 to 64
  



       year old  









 









 50543  Refused  2017  Influenza Virus Vaccine, Quadrivalent, 3 Yr > Quad
,  



       Preserv Free  







Vital Signs







 Date  Vital  Result  Comment

 

 2019  3:40pm  Weight  132.00 lb  









 Weight  59.875 kg  

 

 BP Systolic  132 mmHg  

 

 BP Diastolic  70 mmHg  

 

 Heart Rate  84 /min  

 

 Body Temperature  99.0 F  

 

 Respiratory Rate  18 /min  









 2019 11:06am  Weight  133.00 lb  









 Weight  60.329 kg  

 

 BP Systolic  134 mmHg  

 

 BP Diastolic  74 mmHg  

 

 Heart Rate  82 /min  

 

 Body Temperature  97.9 F  

 

 Respiratory Rate  16 /min  









 2019 10:26am  Weight  132.00 lb  









 Weight  59.875 kg  

 

 BP Systolic  130 mmHg  

 

 BP Diastolic  78 mmHg  

 

 Heart Rate  96 /min  

 

 Body Temperature  98.2 F  

 

 Respiratory Rate  16 /min  









 2019  9:19am  Weight  132.00 lb  









 Weight  59.875 kg  

 

 BP Systolic  150 mmHg  

 

 BP Diastolic  80 mmHg  

 

 Heart Rate  84 /min  

 

 Body Temperature  97.7 F  

 

 Respiratory Rate  20 /min  









 2018  2:18pm  Weight  130.00 lb  









 Weight  58.968 kg  

 

 BP Systolic  160 mmHg  

 

 BP Diastolic  72 mmHg  

 

 Heart Rate  72 /min  

 

 Body Temperature  98.2 F  

 

 Respiratory Rate  16 /min  









 2018  4:15pm  Weight  129.00 lb  









 Weight  58.514 kg  

 

 BP Systolic  168 mmHg  

 

 BP Diastolic  92 mmHg  

 

 Heart Rate  100 /min  

 

 Body Temperature  98.7 F  

 

 Respiratory Rate  16 /min  

 

 O2 % BldC Oximetry  94 %  









 2018  9:03am  Weight  128.00 lb  









 Weight  58.061 kg  

 

 BP Systolic  160 mmHg  

 

 BP Diastolic  80 mmHg  

 

 Heart Rate  88 /min  

 

 Body Temperature  97.3 F  

 

 Respiratory Rate  15 /min  

 

 O2 % BldC Oximetry  98 %  









 06/15/2017  2:26pm  Weight  126.00 lb  









 Weight  57.154 kg  

 

 BP Systolic  146 mmHg  

 

 BP Diastolic  78 mmHg  

 

 Heart Rate  92 /min  

 

 Body Temperature  98.4 F  

 

 Respiratory Rate  24 /min  

 

 O2 % BldC Oximetry  92 %  









 2017  3:46pm  Weight  127.00 lb  









 Weight  57.607 kg  

 

 BP Systolic  130 mmHg  

 

 BP Diastolic  68 mmHg  

 

 Heart Rate  91 /min  

 

 Body Temperature  99.1 F  

 

 Respiratory Rate  24 /min  

 

 O2 % BldC Oximetry  93 %  









 2017 11:46am  Weight  129.00 lb  









 Weight  58.514 kg  

 

 BP Systolic  148 mmHg  

 

 BP Diastolic  88 mmHg  

 

 Heart Rate  98 /min  

 

 Body Temperature  99.3 F  

 

 Respiratory Rate  18 /min  

 

 O2 % BldC Oximetry  97 %  









 2017  2:51pm  Weight  129.00 lb  









 Weight  58.514 kg  

 

 BP Systolic  170 mmHg  

 

 BP Diastolic  82 mmHg  

 

 Heart Rate  104 /min  

 

 Body Temperature  99.3 F  

 

 O2 % BldC Oximetry  92 %  97 after treatment









 2016 10:45am  Weight  129.00 lb  









 Weight  58.514 kg  

 

 BP Systolic  148 mmHg  

 

 BP Diastolic  78 mmHg  

 

 Heart Rate  98 /min  

 

 Body Temperature  98.2 F  

 

 Respiratory Rate  17 /min  

 

 O2 % BldC Oximetry  98 %  









 2016 11:26am  Weight  129.00 lb  









 Weight  58.514 kg  

 

 BP Systolic  150 mmHg  

 

 BP Diastolic  93 mmHg  

 

 Heart Rate  96 /min  

 

 Body Temperature  99.7 F  

 

 O2 % BldC Oximetry  96 %  









 2014  9:25am  Weight  132.00 lb  









 Weight  59.875 kg  

 

 BP Systolic  166 mmHg  

 

 BP Diastolic  78 mmHg  

 

 Heart Rate  116 /min  

 

 Height  63.5 inches  5'3.50"

 

 BMI (Body Mass Index)  23.0 kg/m2  









 2013  2:52pm  Weight  130.00 lb  









 Weight  58.968 kg  

 

 BP Systolic  130 mmHg  

 

 BP Diastolic  80 mmHg  

 

 Heart Rate  88 /min  

 

 Body Temperature  98.6 F  









 2013  4:50pm  Weight  131.00 lb  









 Weight  59.422 kg  

 

 BP Systolic  162 mmHg  

 

 BP Diastolic  68 mmHg  

 

 Heart Rate  88 /min  

 

 Body Temperature  99.9 F  









 2012  2:27pm  Weight  116.00 lb  









 Weight  52.618 kg  

 

 BP Systolic  120 mmHg  

 

 BP Diastolic  72 mmHg  

 

 Heart Rate  80 /min  

 

 Body Temperature  99.0 F  









 2011  1:20pm  Weight  111.00 lb  









 Weight  50.350 kg  

 

 BP Systolic  130 mmHg  

 

 BP Diastolic  60 mmHg  

 

 Heart Rate  96 /min  

 

 Height  63.5 inches  5'3.50"

 

 BMI (Body Mass Index)  19.4 kg/m2  









 2011 10:51am  Weight  113.00 lb  









 Weight  51.257 kg  

 

 BP Systolic  144 mmHg  

 

 BP Diastolic  50 mmHg  

 

 Heart Rate  96 /min  

 

 Height  63.5 inches  5'3.50"

 

 BMI (Body Mass Index)  19.7 kg/m2  







Results







 Test  Date  Facility  Test  Result  H/L  Range  Note

 

 Urine Culture And  2019  [facility]  Urine Culture
  SEE RESULT      1, 2



 Sensitivities    [phone]    BELOW      

 

 Urine DIP  2019  In House Lab  Leukocytes  neg    Neg  



     (607)-   -          









 Urine Nitrites  neg    Neg  

 

 Urobilinogen  norm    Norm  

 

 Total Protein Urine  neg    Neg  

 

 Urine pH  5    5-6  

 

 Urine Blood  neg    Neg  

 

 Specific Gravity  1.015    1.01-1.02  

 

 Urine Ketones  neg    Neg  

 

 Urine Bilirubin  neg    Neg  

 

 Urine Glucose  norm    Norm  









 CBC Auto Diff  2019  [facility]  White Blood  6.4 
10^3/uL  N  3.5-10.8  3



     [phone]  Count        









 Red Blood Count  5.18 10^6/uL  High  3.70-4.87  

 

 Hemoglobin  14.8 g/dL  N  12.0-16.0  

 

 Hematocrit  45 %  High  33-41  

 

 Mean Corpuscular Volume  87 fL  N  80-97  

 

 Mean Corpuscular Hemoglobin  29 pg  N  27-31  

 

 Mean Corpuscular HGB Conc  33 g/dL  N  31-36  

 

 Red Cell Distribution Width  15 %  N  10.5-15  

 

 Platelet Count  297 10^3/uL  N  150-450  

 

 Mean Platelet Volume  8.5 fL  N  7.4-10.4  

 

 Abs Neutrophils  3.9 10^3/uL  N  1.5-7.7  

 

 Abs Lymphocytes  1.7 10^3/uL  N  1.0-4.8  

 

 Abs Monocytes  0.6 10^3/uL  N  0-0.8  

 

 Abs Eosinophils  0.1 10^3/uL  N  0-0.6  

 

 Abs Basophils  0.1 10^3/uL  N  0-0.2  

 

 Abs Nucleated RBC  0 10^3/uL      

 

 Granulocyte %  61.0 %      

 

 Lymphocyte %  26.8 %      

 

 Monocyte %  10.1 %      

 

 Eosinophil %  1.2 %      

 

 Basophil %  0.9 %      

 

 Nucleated Red Blood Cells %  0.1      









 Comp Metabolic Panel  2019  [facility] system [facility]  Sodium  
138 mmol/L  N  135-145  



     [phone]          









 Potassium  4.3 mmol/L  N  3.5-5.0  

 

 Chloride  101 mmol/L  N  101-111  

 

 Co2 Carbon Dioxide  28 mmol/L  N  22-32  

 

 Anion Gap  9 mmol/L  N  2-11  

 

 Glucose  83 mg/dL  N    

 

 Blood Urea Nitrogen  21 mg/dL  N  6-24  

 

 Creatinine  0.81 mg/dL  N  0.51-0.95  

 

 BUN/Creatinine Ratio  25.9  High  8-20  

 

 Calcium  9.8 mg/dL  N  8.6-10.3  

 

 Total Protein  7.7 g/dL  N  6.4-8.9  

 

 Albumin  4.7 g/dL  N  3.2-5.2  

 

 Globulin  3.0 g/dL  N  2-4  

 

 Albumin/Globulin Ratio  1.6  N  1-3  

 

 Total Bilirubin  0.40 mg/dL  N  0.2-1.0  

 

 Alkaline Phosphatase  81 U/L  N    

 

 Alt  13 U/L  N  7-52  

 

 Ast  19 U/L  N  13-39  

 

 Egfr Non-  67.9    >60  

 

 Egfr   82.1    >60  4









 Laboratory test  2019  [facility]  C Reactive  
2.74 mg/L  N  <8.01  5



 finding    [phone]  Protein        

 

 Laboratory test  2019  [facility]  C Reactive  
10.62 mg/L  High  <8.01  



 finding    [phone]  Protein        









 Lactic Acid  3.0 mmol/L  High  0.5-2.0  6

 

 Blood Culture  SEE RESULT BELOW      7









 Comp Metabolic  2019  [facility]  Sodium  134 mmol/
L  Low  135-145  



 Panel    [phone]          









 Potassium  3.5 mmol/L  N  3.5-5.0  

 

 Chloride  99 mmol/L  Low  101-111  

 

 Co2 Carbon Dioxide  24 mmol/L  N  22-32  

 

 Anion Gap  11 mmol/L  N  2-11  

 

 Glucose  190 mg/dL  High    

 

 Blood Urea Nitrogen  16 mg/dL  N  6-24  

 

 Creatinine  0.87 mg/dL  N  0.51-0.95  

 

 BUN/Creatinine Ratio  18.4  N  8-20  

 

 Calcium  9.6 mg/dL  N  8.6-10.3  

 

 Total Protein  7.9 g/dL  N  6.4-8.9  

 

 Albumin  4.6 g/dL  N  3.2-5.2  

 

 Globulin  3.3 g/dL  N  2-4  

 

 Albumin/Globulin Ratio  1.4  N  1-3  

 

 Total Bilirubin  0.60 mg/dL  N  0.2-1.0  

 

 Alkaline Phosphatase  84 U/L  N    

 

 Alt  13 U/L  N  7-52  

 

 Ast  19 U/L  N  13-39  

 

 Egfr Non-  62.5    >60  

 

 Egfr   75.6    >60  8









 Laboratory test  2019  [facility]  Partial Thrombo
  27.3  N  26.0-36.3  



 finding    [phone]  Time PTT  seconds      

 

 Urinalysis  2019  [facility]  Urine  Turbid      



 Profile    [phone]  Appearance        









 Urine Specific Gravity  1.017  N  1.010-1.030  

 

 Urine pH  6.0  N  5-9  

 

 Urine Urobilinogen  Negative    Negative  

 

 Urine Ketones  Negative    Negative  

 

 Urine Protein  2+(100 mg/dL)  Abnormal  Negative  

 

 Urine Leukocytes  2+  Abnormal  Negative  

 

 Urine Blood  3+  Abnormal  Negative  

 

 Urine Nitrite  Positive  Abnormal  Negative  

 

 Urine Bilirubin  Negative    Negative  

 

 Urine Glucose  Negative    Negative  

 

 Urine White Blood Cell  3+(>20/hpf)  Abnormal  Absent  

 

 Urine Red Blood Cell  3+(>10/hpf)  Abnormal  Absent  

 

 Urine Bacteria  Absent    Absent  

 

 Urine Color  Chantelle      









 Urine Culture And  2019  [facility]  Urine  SEE 
RESULT      9



 Sensitivities    [phone]  Culture  BELOW      

 

 CBC Auto Diff  2019  [facility]  White Blood  13.9
  High  3.5-1  



     [phone]  Count  10^3/uL    0.8  









 Red Blood Count  5.21 10^6/uL  High  3.70-4.87  

 

 Hemoglobin  14.9 g/dL  N  12.0-16.0  

 

 Hematocrit  45 %  High  33-41  

 

 Mean Corpuscular Volume  86 fL  N  80-97  

 

 Mean Corpuscular Hemoglobin  29 pg  N  27-31  

 

 Mean Corpuscular HGB Conc  33 g/dL  N  31-36  

 

 Red Cell Distribution Width  14 %  N  10.5-15  

 

 Platelet Count  256 10^3/uL  N  150-450  

 

 Mean Platelet Volume  8.4 fL  N  7.4-10.4  

 

 Abs Neutrophils  12.2 10^3/uL  High  1.5-7.7  

 

 Abs Lymphocytes  0.9 10^3/uL  Low  1.0-4.8  

 

 Abs Monocytes  0.8 10^3/uL  N  0-0.8  

 

 Abs Eosinophils  0 10^3/uL  N  0-0.6  

 

 Abs Basophils  0 10^3/uL  N  0-0.2  

 

 Abs Nucleated RBC  0 10^3/uL      

 

 Granulocyte %  87.5 %      

 

 Lymphocyte %  6.2 %      

 

 Monocyte %  5.9 %      

 

 Eosinophil %  0.2 %      

 

 Basophil %  0.2 %      

 

 Nucleated Red Blood Cells %  0      









 Inr/Protime  2019  [facility] system [facility]  Inr  0.94  N  0.77-
1.02  



     [phone]          

 

 Comp Metabolic Panel  2019  [facility]  Sodium  
139 mmol/L  N  135-145  



     [phone]          









 Potassium  4.2 mmol/L  N  3.5-5.0  

 

 Chloride  104 mmol/L  N  101-111  

 

 Co2 Carbon Dioxide  25 mmol/L  N  22-32  

 

 Anion Gap  10 mmol/L  N  2-11  

 

 Glucose  101 mg/dL  High    

 

 Blood Urea Nitrogen  21 mg/dL  N  6-24  

 

 Creatinine  0.77 mg/dL  N  0.51-0.95  

 

 BUN/Creatinine Ratio  27.3  High  8-20  

 

 Calcium  9.7 mg/dL  N  8.6-10.3  

 

 Total Protein  7.8 g/dL  N  6.4-8.9  

 

 Albumin  4.7 g/dL  N  3.2-5.2  

 

 Globulin  3.1 g/dL  N  2-4  

 

 Albumin/Globulin Ratio  1.5  N  1-3  

 

 Total Bilirubin  0.50 mg/dL  N  0.2-1.0  

 

 Alkaline Phosphatase  80 U/L  N    

 

 Alt  14 U/L  N  7-52  

 

 Ast  20 U/L  N  13-39  

 

 Egfr Non-  71.9    >60  

 

 Egfr   87.1    >60  10









 CBC Auto Diff  2019  [facility]  White Blood  7.4 
10^3/uL  N  3.5-10.8  



     [phone]  Count        









 Red Blood Count  5.19 10^6/uL  High  3.70-4.87  

 

 Hemoglobin  15.0 g/dL  N  12.0-16.0  

 

 Hematocrit  45 %  High  33-41  

 

 Mean Corpuscular Volume  87 fL  N  80-97  

 

 Mean Corpuscular Hemoglobin  29 pg  N  27-31  

 

 Mean Corpuscular HGB Conc  33 g/dL  N  31-36  

 

 Red Cell Distribution Width  14 %  N  10.5-15  

 

 Platelet Count  262 10^3/uL  N  150-450  

 

 Mean Platelet Volume  8.7 fL  N  7.4-10.4  

 

 Abs Neutrophils  5.3 10^3/uL  N  1.5-7.7  

 

 Abs Lymphocytes  1.3 10^3/uL  N  1.0-4.8  

 

 Abs Monocytes  0.6 10^3/uL  N  0-0.8  

 

 Abs Eosinophils  0.1 10^3/uL  N  0-0.6  

 

 Abs Basophils  0.1 10^3/uL  N  0-0.2  

 

 Abs Nucleated RBC  0 10^3/uL      

 

 Granulocyte %  72.2 %      

 

 Lymphocyte %  17.5 %      

 

 Monocyte %  8.5 %      

 

 Eosinophil %  1.0 %      

 

 Basophil %  0.8 %      

 

 Nucleated Red Blood Cells %  0.1      









 Laboratory test  2019  [facility]  Vitamin B12  
587 pg/mL  N  180-914  11



 finding    [phone]          









 Folate  > 20.00 ng/mL    >3.99  12









 Laboratory test  2018  In House Lab  Hemoglobin  11.1      



 finding    ([phone]   -          

 

 CBC Auto Diff  2018  [facility]  White Blood Count
  8.4 10^3/uL  N  3.5-10.  13



     [phone]        8  









 Red Blood Count  4.52 10^6/uL  N  4.0-5.4  

 

 Hemoglobin  13.2 g/dL  N  12.0-16.0  

 

 Hematocrit  39 %  N  35-47  

 

 Mean Corpuscular Volume  86 fL  N  80-97  

 

 Mean Corpuscular Hemoglobin  29 pg  N  27-31  

 

 Mean Corpuscular HGB Conc  34 g/dL  N  31-36  

 

 Red Cell Distribution Width  14 %  N  10.5-15  

 

 Platelet Count  246 10^3/uL  N  150-450  

 

 Mean Platelet Volume  8 um3  N  7.4-10.4  

 

 Abs Neutrophils  5.5 10^3/uL  N  1.5-7.7  

 

 Abs Lymphocytes  1.9 10^3/uL  N  1.0-4.8  

 

 Abs Monocytes  0.8 10^3/uL  N  0-0.8  

 

 Abs Eosinophils  0.2 10^3/uL  N  0-0.6  

 

 Abs Basophils  0.1 10^3/uL  N  0-0.2  

 

 Abs Nucleated RBC  0 10^3/uL      

 

 Granulocyte %  65.1 %  N  38-83  

 

 Lymphocyte %  22.8 %  Low  25-47  

 

 Monocyte %  9.1 %  High  0-7  

 

 Eosinophil %  1.9 %  N  0-6  

 

 Basophil %  1.1 %  N  0-2  

 

 Nucleated Red Blood Cells %  0.1      









 Inr/Protime  2018  [facility] system [facility]  Inr  0.91  N  0.77-
1.02  



     [phone]          

 

 Type & Screen  2018  [facility] system [facility]  Patient Blood  B 
Positive      



     [phone]  Type        









 Antibody Screen  NEGATIVE      









 Comp Metabolic Panel  2018  [facility] system [facility]  Sodium  
137 mmol/L  N  133-145  



     [phone]          









 Potassium  4.2 mmol/L  N  3.5-5.0  

 

 Chloride  102 mmol/L  N  101-111  

 

 Co2 Carbon Dioxide  28 mmol/L  N  22-32  

 

 Anion Gap  7 mmol/L  N  2-11  

 

 Glucose  109 mg/dL  High    

 

 Blood Urea Nitrogen  25 mg/dL  High  6-24  

 

 Creatinine  1.42 mg/dL  High  0.51-0.95  

 

 BUN/Creatinine Ratio  17.6  N  8-20  

 

 Calcium  9.5 mg/dL  N  8.6-10.3  

 

 Total Protein  7.5 g/dL  N  6.4-8.9  

 

 Albumin  4.3 g/dL  N  3.2-5.2  

 

 Globulin  3.2 g/dL  N  2-4  

 

 Albumin/Globulin Ratio  1.3  N  1-3  

 

 Total Bilirubin  0.30 mg/dL  N  0.2-1.0  

 

 Alkaline Phosphatase  76 U/L  N    

 

 Alt  11 U/L  N  7-52  

 

 Ast  18 U/L  N  13-39  

 

 Egfr Non-  35.6    >60  

 

 Egfr   45.8    >60  14









 Laboratory test  2017  [facility] system [facility]  Fecal 
Lactoferrin  SEE RESULT      15



 finding    [phone]  (Stool WBC)  BELOW      









 C Difficile PCR  SEE RESULT BELOW      16









 Stool Panel ([facility])  2017  [facility] system [facility]  Stool Culture
  <pending>      



     [phone]          









 Fecal Lactoferrin (Stool WBC)  <pending>      

 

 O&P Ova & Parasites Screen  <pending>      

 

 C Difficile PCR  <pending>      









 Stool Panel  2016  [facility] system [facility]  Stool For  SEE 
RESULT      17



 ([facility])    [phone]  Blood  BELOW      

 

 CBC Auto Diff  2014  Health system Laboratory  White Blood  6.7 
10^3/uL    4.8-10.  



     [phone]  Count      8  









 Red Blood Count  5.20 10^6/uL    4.0-5.4  

 

 Hemoglobin  15.3 g/dL    12.0-16.0  

 

 Hematocrit  45 %    35-47  

 

 Mean Corpuscular Volume  87 fL    80-97  

 

 Mean Corpuscular Hemoglobin  29 pg    27-31  

 

 Mean Corpuscular HGB Conc  34 g/dL    31-36  

 

 Red Cell Distribution Width  14 %    10.5-15  

 

 Platelet Count  270 10^3/uL    150-450  

 

 Mean Platelet Volume  9 um3    7.4-10.4  

 

 Abs Neutrophils  4.2 10^3/uL    1.5-7.7  

 

 Abs Lymphocytes  1.8 10^3/uL    1.0-4.8  

 

 Abs Monocytes  0.6 10^3/uL    0-0.8  

 

 Abs Eosinophils  0.1 10^3/uL    0-0.6  

 

 Abs Basophils  0.1 10^3/uL    0-0.2  

 

 Abs Nucleated RBC  0 10^3/uL      

 

 Granulocyte %  62.4 %    38-83  

 

 Lymphocyte %  27.2 %    25-47  

 

 Monocyte %  8.6 %    1-9  

 

 Eosinophil %  0.9 %    0-6  

 

 Basophil %  0.9 %    0-2  

 

 Nucleated Red Blood Cells %  0.1      









 Comp Metabolic Panel  2014  Health system Laboratory  Sodium  
137 mmol/L    133-145  



     [phone]          









 Potassium  3.9 mmol/L    3.5-5.0  

 

 Chloride  101 mmol/L    101-111  

 

 Co2 Carbon Dioxide  27.0 mmol/L    22-32  

 

 Anion Gap  9.0 mmol/L    2-11  

 

 Glucose  96 mg/dL      

 

 Blood Urea Nitrogen  12 mg/dL    6-24  

 

 Creatinine  0.70 mg/dL    0.50-1.40  

 

 BUN/Creatinine Ratio  17.1    8-20  

 

 Calcium  10.5 mg/dL  High  8.1-9.9  

 

 Total Protein  7.6 g/dL    6.2-8.1  

 

 Albumin  4.8 g/dL    3.2-5.2  

 

 Globulin  2.8 g/dL    2-4  

 

 Albumin/Globulin Ratio  1.7    1-3  

 

 Total Bilirubin  0.5 mg/dL    0.4-1.5  

 

 Alkaline Phosphatase  72 U/L      

 

 Alt  16 U/L    14-54  

 

 Ast  24 U/L    12-42  

 

 Egfr Non-  81.4    >60  

 

 Egfr   104.6    >60  18









 Lipid Profile  2014  Health system Laboratory  Triglycerides  
137 mg/dL      



 (Trig/Chol/HDL)    [phone]          









 Cholesterol  265 mg/dL  High  Less than 200  

 

 HDL Cholesterol  75 mg/dL  High  40-60  19

 

 Cholesterol/HDL Ratio  3.5 Average    1-4.44  

 

 LDL Cholesterol  162.6  High  Less Than 100  20









 Urine DIP  2014  In House Lab  Leukocytes  NEG    Neg  



     (607)-   -          









 Urine Nitrites  NEG    Neg  

 

 Urine pH  5    5-6  

 

 Total Protein, Urine  NEG    Neg  

 

 Urine Glucose  NORM    Norm  

 

 Urine Ketones  NEG    Neg  

 

 Urobilinogen  NORM    Norm  

 

 Urine Bilirubin  NEG    Neg  

 

 Urine Blood  NEG    Neg  

 

 Specific Gravity  1.020    1.01-1.02  









 PT W/Inr  2012  Health system Laboratory  Inr  0.94    0.88-1.13
  21



     [phone]          









 Protime  11.1 SEC    10.3-13.5  22









 Laboratory test  2012  [facility] system Laboratory  PTT (Aptt)  
23.2 SEC  Low  25.1-38.5  



 finding    [phone]          

 

 Urinalysis  2012  [facility] system Laboratory  Ua Color  YELLOW    
Yellow  



 W/Microscopic    [phone]          









 Appearance-Urine  CLEAR    Clear  

 

 Specific Gravity-Ur  1.005  Low  1.010-1.030  

 

 Esterase-Urine  NEGATIVE    Negative  

 

 Nitrite  NEGATIVE    Negative  

 

 Urobilinogen-Ur-DIP  NEGATIVE    Negative  

 

 Protein-Urine  NEGATIVE    Negative  

 

 PH-Urine  6.5    5-9  

 

 Blood-Urine  TRACE  Abnormal  Negative  

 

 Ketones-Urine  NEGATIVE    Negative  

 

 Bilirubin-Ur  NEGATIVE    Negative  

 

 Glucose-Urine  NEGATIVE    Negative  

 

 WBC-Urine  0-2    0-5  

 

 RBC-Urine  RARE    0-2  

 

 Epith Cells-Ur  SMALL    None  

 

 Bacteria-Urine  1+    None  









 Manual Differential  2012  Health system Laboratory  
Polysegmented  79 %    38-83  



     [phone]  Neutrophil        









 Lymphocyte  12 %  Low  25-47  

 

 Monocyte  9 %    0-13  

 

 Absolute Neutrophil Count  7.9      

 

 RBC Morphology  NORMAL      









 CBC Auto Diff  2012  Health system Laboratory  White Blood  10.0 
CUMM    4.8-10.8  



     [phone]  Count        









 Red Cell Count  3.81 CUMM  Low  4.2-5.4  

 

 Hemoglobin  11.5 g/dL  Low  12.0-16.0  

 

 Hematocrit  33 %  Low  35-47  

 

 Mean Corpuscular Volume  87 um3    79-97  

 

 Mean Corpuscular Hemoglob  30 pg    27-31  

 

 Mean Corpuscular HGB Cone  35 g/dL    32-36  

 

 Redcell Distribution WDTH  14 %    10.5-15  

 

 Platelet Count  274 CUMM    150-450  

 

 Mean Platelet Volume  7.8 um3    7.4-10.4  23









 Laboratory test  2012  [facility] system Laboratory  Lipase  19 U/L  
Low  22-51  



 finding    [phone]          

 

 Comp Metabolic  2012  Health system Laboratory  Sodium  136 mmol/
L    135-145  



 Panel    [phone]          









 Potassium  3.5 mmol/L    3.5-5.0  

 

 Chloride  101 mmol/L    101-111  

 

 Co2 (Carbon Dioxide)  25.0 mmol/L    22-32  

 

 Anion Gap  10.0 mmol/L    2-11  24

 

 Glucose  109 mg/dL  High    

 

 BUN  16 mg/dL    6-24  

 

 Creatinine  0.6 mg/dL    0.50-1.40  

 

 One Over Creatinine  1.66      

 

 BUN/Creatinine Ratio  26.7  High  8-20  

 

 Calcium  8.5 mg/dL    8.1-9.9  

 

 Total Protein  6.5 GM/DL    6.2-8.1  

 

 Albumin  3.5 GM/DL    3.2-5.2  

 

 Globulin  3.0 GM/DL    2-4  

 

 Albumin/Globulin Ratio  1.2    1-3  

 

 Bilirubin Total  0.7 mg/dL    0.4-1.5  25

 

 Alkaline Phosphatase  78 U/L      

 

 Alt (SGPT)  17 U/L    14-54  

 

 Ast (Sgot)  22 U/L    12-42  

 

 eGFR Non-  97.7    > 60  

 

 eGFR   125.7    > 60  26









 Laboratory test  2011  [facility] system [facility]  PTT (Aptt)  
22.3  Low  25.15-38.53  



 finding    [phone]          

 

 Manual  2011  [facility] system Laboratory  Polysegmented  91 %  
High  38-83  



 Differential    [phone]  Neutrophil        









 Lymphocyte  7 %  Low  25-47  

 

 Monocyte  2 %    0-13  

 

 Absolute Neutrophil Count  12.4      

 

 RBC Morphology  NORMAL      









 PT W/Inr  2011  [facility] system [facility]  Inr  0.98    0.88-1.13
  27



     [phone]          









 Protime  11.5 SEC    10.3-13.5  28









 CBC Auto Diff  2011  [facility] system Laboratory  White Blood  13.7 
CUMM  High  4.8-10.8  



     [phone]  Count        









 Red Cell Count  4.27 CUMM    4.2-5.4  

 

 Hemoglobin  13.2 g/dL    12.0-16.0  

 

 Hematocrit  38 %    35-47  

 

 Mean Corpuscular Volume  90 um3    79-97  

 

 Mean Corpuscular Hemoglob  31 pg    27-31  

 

 Mean Corpuscular HGB Cone  34 g/dL    32-36  

 

 Redcell Distribution WDTH  13 %    10.5-15  

 

 Platelet Count  251 CUMM    150-450  

 

 Mean Platelet Volume  8.1 um3    7.4-10.4  29









 Urine DIP  2011  In House Lab  Leukocytes  ++    Neg  



     (607)-   -          









 Urine Nitrites  NEG    Neg  

 

 Urine pH  5    5-6  

 

 Total Protein, Urine  TRACE    Neg  

 

 Urine Glucose  NORM    Norm  

 

 Urine Ketones  NEG    Neg  

 

 Urobilinogen  NORM    Norm  

 

 Urine Bilirubin  NEG    Neg  

 

 Urine Blood  TRACE    Neg  

 

 Specific Gravity  NA  Low  1.01-1.02  









 Lipid Profile  2011  [facility] system Laboratory  Triglyceride  110 
mg/dL      30



 (Trig/Chol/HDL)    [phone]          









 Cholesterol  207 mg/dL  High  Less Than 200  31

 

 High Density Lipoprotein  58 mg/dL    40-60  32

 

 Cholesterol/HDL Ratio  3.57 AVERAGE    1-4.44  

 

 Low Density Lipoprotein  127 mg/dL  High  Less Than 100  33









 Laboratory test  2011  [facility] system [facility]  TSH  1.04 MIU/
ML    0.34-5.60  



 finding    [phone]          

 

 Comp Metabolic  2011  [facility] system Laboratory  Sodium  140 mmol/
L    135-145  



 Panel    [phone]          









 Potassium  3.6 mmol/L    3.5-5.0  

 

 Chloride  104 mmol/L    101-111  

 

 Co2 (Carbon Dioxide)  29.0 mmol/L    22-32  

 

 Anion Gap  7.0 mmol/L    2-11  34

 

 Glucose  70 mg/dL      

 

 BUN  17 mg/dL    6-24  

 

 Creatinine  0.80 mg/dL    0.50-1.40  

 

 One Over Creatinine  1.20      

 

 BUN/Creatinine Ratio  21.3  High  8-20  

 

 Calcium  9.3 mg/dL    8.1-9.9  

 

 Total Protein  7.2 GM/DL    6.2-8.1  

 

 Albumin  4.4 GM/DL    3.2-5.2  

 

 Globulin  2.8 GM/DL    2-4  

 

 Albumin/Globulin Ratio  1.6    1-3  

 

 Bilirubin Total  0.6 mg/dL    0.4-1.5  35

 

 Alkaline Phosphatase  76 U/L      

 

 Alt (SGPT)  14 U/L    14-54  

 

 Ast (Sgot)  21 U/L    12-42  

 

 eGFR Non-  70.3    > 60  

 

 eGFR   90.4    > 60  36









 CBC Auto Diff  2011  [facility] system Laboratory  White Blood  8.1 
CUMM    4.8-10.8  



     [phone]  Count        









 Red Cell Count  4.73 CUMM    4.2-5.4  

 

 Hemoglobin  14.4 g/dL    12.0-16.0  

 

 Hematocrit  43 %    35-47  

 

 Mean Corpuscular Volume  90 um3    79-97  

 

 Mean Corpuscular Hemoglob  31 pg    27-31  

 

 Mean Corpuscular HGB Cone  34 g/dL    32-36  

 

 Redcell Distribution WDTH  14 %    10.5-15  

 

 Platelet Count  251 CUMM    150-450  

 

 Mean Platelet Volume  8.7 um3    7.4-10.4  

 

 Gran %  64.6 %    38-83  

 

 Lymph %  26.3 %    25-47  

 

 Mononuclear %  7.7 %    1-9  

 

 Eosinophil %  0.7 %    0-6  

 

 Basophil %  0.7 %    0-2  

 

 Abs Lymphs  2.1    1.0-4.8  

 

 Abs Mononuclear  0.6    0-0.8  

 

 Absolute Neutrophil Count  5.3    1.5-7.7  

 

 Abs Eosinophils  0.1    0-0.6  

 

 Abs Basophils  0.1    0-0.2  









 Sensitivities For Urine  2011  Health system Laboratory  
Ampicillin  >=32  R    



 Culture    [phone]          









 Amikacin  <=2  S    

 

 Ciprofloxacin  <=0.25  S    

 

 Ceftriaxone  <=1  S    

 

 Cefazolin  16  I    

 

 Nitrofurantoin  <=16  S    

 

 Gentamicin  <=1  S    

 

 Imipenem  <=1  S    

 

 Levofloxacin  <=0.12  S    

 

 Trimeth-Sulfa  <=20  S    

 

 Ceftazidime  <=1  S    

 

 Tigecycline  <=0.5  S    

 

 Piperacillin/Tazobactam KB  29  S    









 Urinalysis  2011  Health system Laboratory  Ua Color  YELLOW    
Yellow  



 W/Microscopic    [phone]          









 Appearance-Urine  CLEAR    Clear  

 

 Specific Gravity-Ur  1.023    1.010-1.030  

 

 Esterase-Urine  2+  Abnormal  Negative  

 

 Nitrite  NEGATIVE    Negative  

 

 Urobilinogen-Ur-DIP  NEGATIVE    Negative  

 

 Protein-Urine  NEGATIVE    Negative  

 

 PH-Urine  6.0    5-9  

 

 Blood-Urine  NEGATIVE    Negative  

 

 Ketones-Urine  NEGATIVE    Negative  

 

 Bilirubin-Ur  NEGATIVE    Negative  

 

 Glucose-Urine  NEGATIVE    Negative  

 

 WBC-Urine  15-20  Abnormal  0-5  

 

 RBC-Urine  0-2    0-2  

 

 Epith Cells-Ur  FEW    None  

 

 Bacteria-Urine  TRACE    None  









 Urine Culture &  2011  Health system Laboratory  Urine Culture  
ESCHERICHIA COLI      37



 Sensitivi    [phone]  Sensitivi        









 1  [id number]

 

 2  SEE RESULT BELOW



   -----------------------------------------------------------------------------
---------------



   Name:  ANNIKA PHELAN                 : 1935    Attend Dr: Abraham Roberson MD



   Acct:  [id number]  Unit: [id number]  AGE: 83            Location:  [facility]



   Re19                        SEX: F             Status:    REG REF



   -----------------------------------------------------------------------------
---------------



   



   SPEC: [id number]         DANUTA:       [facility] DR: Abraham Roberson MD



   REQ:  [id number]              RECD:   



   STATUS: COMP



   _



   SOURCE: URINE          SPDESC:



   ORDERED:  Urine Culture



   COMMENTS: [id number]



   Urine Source: Random



   



   -----------------------------------------------------------------------------
---------------



   Procedure                         Result                         Reported   
        Site



   -----------------------------------------------------------------------------
---------------



   Urine Culture  Final                                             19-
1628      ML



   No Growth (<1,000 CFU/mL)



   



   -----------------------------------------------------------------------------
---------------



   * ML - Main Lab



   .



   



   



   



   



   



   



   



   



   



   



   



   



   



   



   



   



   



   



   



   



   



   



   



   



   ** END OF REPORT **



   



   DEPARTMENT OF PATHOLOGY,  [address] [place] [address]



   Phone # [phone]      Fax #[phone]



   Francesco Camejo M.D. Director     [facility] # [id number]

 

 3  [id number]

 

 4  *******Because ethnic data is not always readily available,



   this report includes an eGFR for both -Americans and



   non- Americans.****



   The National Kidney Disease Education Program (NKDEP) does



   not endorse the use of the MDRD equation for patients that



   are not between the ages of 18 and 70, are pregnant, have



   extremes of body size, muscle mass, or nutritional status,



   or are non- or non-.



   According to the National Kidney Foundation, irrespective of



   diagnosis, the stage of the disease is based on the level of



   kidney function:



   Stage Description                      GFR(mL/min/1.73 m(2))



   1     Kidney damage with normal or decreased GFR       90



   2     Kidney damage with mild decrease in GFR          60-89



   3     Moderate decrease in GFR                         30-59



   4     Severe decrease in GFR                           15-29



   5     Kidney failure                       <15 (or dialysis)

 

 5  [id number]

 

 6  Critical Result LACT:3.0 Called to JULI at: 10:20:07 by:[id number] Read back



   by:[email or web address]



   [facility] Severe Sepsis and Septic Shock Management Bundle Measure



   requires all lactic acids initially measuring >2.0 mmol/L be



   repeated.

 

 7  SEE RESULT BELOW



   -----------------------------------------------------------------------------
---------------



   Name:  ANNIKA PHELAN                 : 1935    Attend Dr: 
Lokesh Galvan MD



   Acct:  [id number]  Unit: [id number]  AGE: 83            Location:  [place] [address]



   Re19      Dis:  19    SEX: F             Status:    DIS Charles



   -----------------------------------------------------------------------------
---------------



   



   SPEC: [id number]         DANUTA:   19-    [facility] DR: Wesley MORRISON



   REQ:  [id number]              RECD:   



   STATUS: COMP             OTHR DR: [facility]



   Abraham Roberson MD



   _



   SOURCE: BLOOD,VENO     SPDESC:



   ORDERED:  Blood Cult



   



   -----------------------------------------------------------------------------
---------------



   Procedure                         Result                         Reported   
        Site



   -----------------------------------------------------------------------------
---------------



   Aerobic Culture Bottle  Final                                    19-
1034      ML



   No Growth Day 5



   



   Anaerobic Culture Bottle  Final                                  19-
1032      ML



   No Growth Day 5



   



   -----------------------------------------------------------------------------
---------------



   * ML - Main Lab



   .



   



   



   



   



   



   



   



   



   



   



   



   



   



   



   



   



   



   



   



   



   



   



   



   ** END OF REPORT **



   



   DEPARTMENT OF PATHOLOGY,  [address] [place] [address]



   Phone # [phone]      Fax #[phone]



   Francesco Camejo M.D. Director     [facility] # [id number]

 

 8  *******Because ethnic data is not always readily available,



   this report includes an eGFR for both -Americans and



   non- Americans.****



   The National Kidney Disease Education Program (NKDEP) does



   not endorse the use of the MDRD equation for patients that



   are not between the ages of 18 and 70, are pregnant, have



   extremes of body size, muscle mass, or nutritional status,



   or are non- or non-.



   According to the National Kidney Foundation, irrespective of



   diagnosis, the stage of the disease is based on the level of



   kidney function:



   Stage Description                      GFR(mL/min/1.73 m(2))



   1     Kidney damage with normal or decreased GFR       90



   2     Kidney damage with mild decrease in GFR          60-89



   3     Moderate decrease in GFR                         30-59



   4     Severe decrease in GFR                           15-29



   5     Kidney failure                       <15 (or dialysis)

 

 9  SEE RESULT BELOW



   -----------------------------------------------------------------------------
---------------



   Name:  ANNIKA PHELAN                 : 1935    Attend Dr: 
Lokesh Galvan MD



   Acct:  [id number]  Unit: [id number]  AGE: 83            Location:  [place] [address]



   Re19      Dis:  19    SEX: F             Status:    DIS Charles



   -----------------------------------------------------------------------------
---------------



   



   SPEC: [id number]         DANUTA:   19-QUITAK     EDNA DR: Herminio Mclaughlin MD



   REQ:  [id number]              RECD:   



   STATUS: [facility] ROSARIO DR: Abraham Roberson MD



   _



   SOURCE: URINE          SPDESC:



   ORDERED:  Urine Culture



   



   -----------------------------------------------------------------------------
---------------



   Procedure                         Result                         Reported   
        Site



   -----------------------------------------------------------------------------
---------------



   Urine Culture  Final                                             19-
822      ML



   



   Organism 1                     ESCHERICHIA COLI



   [place] Count                >100,000 (Many) CFU/ML



   



   1. ESCHERICHIA COLI



   M.I.C.      RX



   --------- ------



   Ampicillin                     >=32        R



   Cefazolin                      <=4         S



   Cefepime                       <=1         S



   Ceftriaxone                    <=1         S



   Ciprofloxacin                  <=0.25      S



   Gentamicin                     <=1         S



   Levofloxacin                   <=0.12      S



   Meropenem                      <=0.25      S



   Nitrofurantoin                 <=16        S



   Tetracycline                   <=1         S



   Pipercillin/Tazobactam         <=4         S



   Trimethoprim/Sulfamethoxazole  <=20        S



   Amoxicillin/Clavulanic Acid    4           S



   Aztreonam                      <=1         S



   



   



   Contact the Microbiology Department for any additional



   antibiotic reporting.



   



   -----------------------------------------------------------------------------
---------------



   * ML - Main Lab



   .



   



   



   



   ** END OF REPORT **



   



   DEPARTMENT OF PATHOLOGY,  [address] [place] [address]



   Phone # [phone]      Fax #[phone]



   Francesco Camejo M.D. Director     [facility] # [id number]

 

 10  *******Because ethnic data is not always readily available,



   this report includes an eGFR for both -Americans and



   non- Americans.****



   The National Kidney Disease Education Program (NKDEP) does



   not endorse the use of the MDRD equation for patients that



   are not between the ages of 18 and 70, are pregnant, have



   extremes of body size, muscle mass, or nutritional status,



   or are non- or non-.



   According to the National Kidney Foundation, irrespective of



   diagnosis, the stage of the disease is based on the level of



   kidney function:



   Stage Description                      GFR(mL/min/1.73 m(2))



   1     Kidney damage with normal or decreased GFR       90



   2     Kidney damage with mild decrease in GFR          60-89



   3     Moderate decrease in GFR                         30-59



   4     Severe decrease in GFR                           15-29



   5     Kidney failure                       <15 (or dialysis)

 

 11  Normal Range 180 to 914



   Indeterminate Range 145 to 180



   Deficient Range  <145

 

 12  [id number]

 

 13  RECTAL BLEEDING

 

 14  *******Because ethnic data is not always readily available,



   this report includes an eGFR for both -Americans and



   non- Americans.****



   The National Kidney Disease Education Program (NKDEP) does



   not endorse the use of the MDRD equation for patients that



   are not between the ages of 18 and 70, are pregnant, have



   extremes of body size, muscle mass, or nutritional status,



   or are non- or non-.



   According to the National Kidney Foundation, irrespective of



   diagnosis, the stage of the disease is based on the level of



   kidney function:



   Stage Description                      GFR(mL/min/1.73 m(2))



   1     Kidney damage with normal or decreased GFR       90



   2     Kidney damage with mild decrease in GFR          60-89



   3     Moderate decrease in GFR                         30-59



   4     Severe decrease in GFR                           15-29



   5     Kidney failure                       <15 (or dialysis)

 

 15  SEE RESULT BELOW



   -----------------------------------------------------------------------------
---------------



   Name:  ANNIKA PHELAN                 : 1935    Attend Dr: John James NP



   Acct:  [id number]  Unit: [id number]  AGE: 81            Location:  [facility]



   Re17                        SEX: F             Status:    REG REF



   -----------------------------------------------------------------------------
---------------



   



   SPEC: [id number]         DANUTA:       SUBM DR: John James NP



   REQ:  [id number]              RECD:   



   STATUS: RES



   _



   SOURCE: STOOL          SPDESC:



   ORDERED:  C. diff PCR, Stool Culture, Fecal Lactoferr, Occult Bl, Scn, O P: 
Ananda/JOSE C



   



   -----------------------------------------------------------------------------
---------------



   Procedure                         Result                         Reported   
        Site



   -----------------------------------------------------------------------------
---------------



   Stool Culture



   PENDING



   



   Stool Specimen Description



   PENDING



   



   Shiga Toxin 1   2



   PENDING



   



   C. difficile PCR



   PENDING



   



   Fecal Lactoferrin (Stool WBC)  Final                             17-
1606      ML



   Fecal Lactoferrin           Positive by Immunoassay



   



   TEST LIMITATIONS:



   



   Assay detects elevated levels of lactoferrin released from



   fecal leukocytes as a marker of intestinal inflammation. The



   test may not be appropriate in immunocompromised persons.



   Fecal samples from breast fed infants should not be used



   with this assay.



   



   Stool Occult Blood (1)  Final                                    17-
1610      ML



   Stool Occult Blood          Positive



   



   Collection Date (1)         17



   



   



   



   ** CONTINUED ON NEXT PAGE **



   



   * ML=Testing performed at Main Lab



   DEPARTMENT OF PATHOLOGY,  [address] [place] [address]



   Phone # [phone]      Fax #[phone]



   Francesco Camejo M.D. Director     [facility] # [id number]



   



   



   



   -----------------------------------------------------------------------------
---------------



   Patient: ANNIKA PHELAN                  [id number]     (Continued)



   -----------------------------------------------------------------------------
---------------



   



   



   Specimen: [id number]    Collected:   Received: 
    (Continued)



   



   -----------------------------------------------------------------------------
---------------



   Procedure                         Result                         Reported   
        Site



   -----------------------------------------------------------------------------
---------------



   O P: Giardia/Cryptospor Screen



   PENDING



   



   -----------------------------------------------------------------------------
---------------



   * ML - MAIN LAB ([facility])



   .



   



   



   



   



   



   



   



   



   



   



   



   



   



   



   



   



   



   



   



   



   



   



   



   



   



   



   



   



   



   



   



   



   



   ** END OF REPORT **



   



   * ML=Testing performed at Main Lab



   DEPARTMENT OF PATHOLOGY,  [address] [place] [address]



   Phone # [phone]      Fax #[phone]



   Francesco Camejo M.D. Director     [facility] # [id number]

 

 16  SEE RESULT BELOW



   -----------------------------------------------------------------------------
---------------



   Name:  ANNIKA PHELAN                 : 1935    Attend Dr: John James NP



   Acct:  [id number]  Unit: [id number]  AGE: 81            Location:  [facility]



   Re17                        SEX: F             Status:    REG REF



   -----------------------------------------------------------------------------
---------------



   



   SPEC: [id number]         DANUTA:       SUBM DR: John James NP



   REQ:  [id number]              RECD:   



   STATUS: COMP



   _



   SOURCE: STOOL          SPDESC:



   ORDERED:  TRINITY keane PCR, Stool Culture, Fecal Lactoferr, Occult Bl, Scn, O P: 
[place]



   



   -----------------------------------------------------------------------------
---------------



   Procedure                         Result                         Reported   
        Site



   -----------------------------------------------------------------------------
---------------



   Stool Culture  Final                                             17-
1050      ML



   



   Result                      No enteric pathogens isolated



   



   Testing for Salmonella, Shigella, Aeromonas, Plesiomonas,



   Yersinia and Campylobacter are included in a Stool Culture.



   



   Vibrio spp not routinely tested for in a stool culture.



   If testing is desired, please request specifically when



   placing test order.



   



   Sensitivities not routinely performed on stool isolates, as



   antibiotics may prolong the carriage rate of bacteria.



   Please contact the microbiology lab if sensitivities are



   required.



   



   Stool Specimen Description  Final                                17-
1633      ML



   Stool Color                 Brown



   Stool Form                  Nonformed



   Stool Consistency           Soft



   



   Shiga Toxin 1   2  Final                                         17-
0920      ML



   



   Organism 1                     Negative Shiga Toxin 1   2



   



   Immunochromatographic Assay



   



   



   



   



   ** CONTINUED ON NEXT PAGE **



   



   * ML=Testing performed at [facility]



   DEPARTMENT OF PATHOLOGY,  [address] [place] [address]



   Phone # [phone]      Fax #[phone]



   Francesco Camejo M.D. Director     [facility] # [id number]



   



   



   



   -----------------------------------------------------------------------------
---------------



   Patient: ANNIKA PHELAN                  [id number]     (Continued)



   -----------------------------------------------------------------------------
---------------



   



   



   Specimen: [id number]    Collected:   Received: 
    (Continued)



   



   -----------------------------------------------------------------------------
---------------



   Procedure                         Result                         Reported   
        Site



   -----------------------------------------------------------------------------
---------------



   Shiga Toxin 1   2  Final   (continued)                           17-
0920



   C. difficile PCR  Final                                          17-
1631      ML



   Organism 1                     027 Presumptive NEGATIVE



   Organism 2                     Toxigenic C.diff NEGATIVE



   



   Fecal Lactoferrin (Stool WBC)  Final                             17-
1606      ML



   Fecal Lactoferrin           Positive by Immunoassay



   



   TEST LIMITATIONS:



   



   Assay detects elevated levels of lactoferrin released from



   fecal leukocytes as a marker of intestinal inflammation. The



   test may not be appropriate in immunocompromised persons.



   Fecal samples from breast fed infants should not be used



   with this assay.



   



   Stool Occult Blood (1)  Final                                    17-
1610      ML



   Stool Occult Blood          Positive



   



   Collection Date (1)         17



   



   O P: Giardia/Cryptospor Screen  Final                            17-
1114      ML



   



   Organism 1                     Neg Cryptosporidium/Giardia



   



   Giardia and cryptosporidium antigen testing performed by



   enzyme immunoassay.  If patient is immunocompromised or has



   traveled to or is from a developing country, a full ova and



   parasite exam with microscopic (OPMIC) is recommended.  All



   samples will be held one month in case full ova and parasite



   testing is requested.  Contact the Microbiology Department



   at [phone].



   



   TEST LIMITATIONS:



   As with all diagnostic procedures, the results obtained



   should be used in conjunction with other clinical



   information available the physician, including confirmation



   



   ** CONTINUED ON NEXT PAGE **



   



   * ML=Testing performed at Main Lab



   DEPARTMENT OF PATHOLOGY,  [address] [place] [address]



   Phone # [phone]      Fax #[phone]



   Francesco Camejo M.D. Director     [facility] # [id number]



   



   



   



   -----------------------------------------------------------------------------
---------------



   Patient: ANNIKA PHELAN                  [id number]     (Continued)



   -----------------------------------------------------------------------------
---------------



   



   



   Specimen: [id number]    Collected:   Received: 
    (Continued)



   



   -----------------------------------------------------------------------------
---------------



   Procedure                         Result                         Reported   
        Site



   -----------------------------------------------------------------------------
---------------



   O P: Giardia/Cryptospor Screen  Final   (continued)              
111



   by another method.  Negative results can occur in samples



   containing antigen below lower limits of detection of the



   assay.  One negative specimen does not rule out the



   possibility of a parasitic infection. To improve detection



   it is recommended that three specimens be collected on



   separate days over a period of not more than seven



   days. The use of colonic washes, aspirates or other diluted



   sample types has not been established and could affect the



   performance of the assay. Stool samples contaminated with an



   oily or particulate base (eg. Barium, mineral oil etc.)



   could interfere with the test and are not recommended.



   



   -----------------------------------------------------------------------------
---------------



   * ML - MAIN LAB ([facility])



   .



   



   



   



   



   



   



   



   



   



   



   



   



   



   



   



   



   



   



   



   



   



   



   



   ** END OF REPORT **



   



   * ML=Testing performed at Main Lab



   DEPARTMENT OF PATHOLOGY,  [address] [place] [address]



   Phone # [phone]      Fax #[phone]



   Francesco Camejo M.D. Director     [facility] # [id number]

 

 17  SEE RESULT BELOW



   -----------------------------------------------------------------------------
---------------



   Name:  ANNIKA PHELAN                 : 1935    Attend Dr: Abraham Roberson MD



   Acct:  [id number]  Unit: [id number]  AGE: 81            Location:  [facility]



   Re16                        SEX: F             Status:    REG REF



   -----------------------------------------------------------------------------
---------------



   



   SPEC: [id number]         DANUTA:       [facility] DR: Abraham Roberson MD



   REQ:  [id number]              RECD:   



   STATUS: COMP



   _



   SOURCE: STOOL          SPDESC:



   ORDERED:  Hemoccult/R, C. diff PCR/S, Stool Culture/R, Fecal Lactoferr/R, O P
: Magy



   



   -----------------------------------------------------------------------------
---------------



   Procedure                         Result                         Reported   
        Site



   -----------------------------------------------------------------------------
---------------



   Stool Culture  Final                                             16-
1118      ML



   



   Result                      No enteric pathogens isolated



   



   Testing for Salmonella, Shigella, Aeromonas, Plesiomonas,



   Yersinia and Campylobacter are included in a Stool Culture.



   



   Vibrio spp not routinely tested for in a stool culture.



   If testing is desired, please request specifically when



   placing test order.



   



   Sensitivities not routinely performed on stool isolates, as



   antibiotics may prolong the carriage rate of bacteria.



   Please contact the microbiology lab if sensitivities are



   required.



   



   Stool Specimen Description  Final                                16-
0853      ML



   Stool Color                 Brown



   Stool Form                  Formed



   Stool Consistency           Firm



   



   Shiga Toxin 1   2  Final                                         16-
822      ML



   



   Organism 1                     Negative Shiga Toxin 1   2



   



   Immunochromatographic Assay



   



   



   



   



   ** CONTINUED ON NEXT PAGE **



   



   * ML=Testing performed at Main Lab



   DEPARTMENT OF PATHOLOGY,  [address] [place] [address]



   Phone # [phone]      Fax #[phone]



   Francesco Camejo M.D. Director     [facility] # [id number]



   



   



   



   -----------------------------------------------------------------------------
---------------



   Patient: ANNIKA PHELAN                  [id number]     (Continued)



   -----------------------------------------------------------------------------
---------------



   



   



   Specimen: [id number]    Collected:   Received: 
    (Continued)



   



   -----------------------------------------------------------------------------
---------------



   Procedure                         Result                         Reported   
        Site



   -----------------------------------------------------------------------------
---------------



   Shiga Toxin 1   2  Final   (continued)                           16-
822



   C. difficile PCR  Final                                          16-
      ML



   Organism 1                     027 Presumptive NEGATIVE



   Organism 2                     Toxigenic C.diff NEGATIVE



   



   Fecal Lactoferrin (Stool WBC)  Final                             16-
1246      ML



   Fecal Lactoferrin           Positive by Immunoassay



   



   TEST LIMITATIONS:



   



   Assay detects elevated levels of lactoferrin released from



   fecal leukocytes as a marker of intestinal inflammation. The



   test may not be appropriate in immunocompromised persons.



   Fecal samples from breast fed infants should not be used



   with this assay.



   



   Stool Occult Blood  Final                                        16-
1939      ML



   Stool Occult Blood          Negative



   



   O P: Giardia/Cryptospor Screen  Final                            16-
0842      ML



   



   Organism 1                     Neg Cryptosporidium/Giardia



   



   Giardia and cryptosporidium antigen testing performed by



   enzyme immunoassay.  If patient is immunocompromised or has



   traveled to or is from a developing country, a full ova and



   parasite exam with microscopic (OPMIC) is recommended.  All



   samples will be held one month in case full ova and parasite



   testing is requested.  Contact the Microbiology Department



   at [phone].



   



   TEST LIMITATIONS:



   As with all diagnostic procedures, the results obtained



   should be used in conjunction with other clinical



   information available the physician, including confirmation



   by another method.  Negative results can occur in samples



   containing antigen below lower limits of detection of the



   



   ** CONTINUED ON NEXT PAGE **



   



   * ML=Testing performed at Main Lab



   DEPARTMENT OF PATHOLOGY,  [address] [place] [address]



   Phone # [phone]      Fax #[phone]



   Francesco Camejo M.D. Director     [facility] # [id number]



   



   



   



   -----------------------------------------------------------------------------
---------------



   Patient: ANNIKA PHELAN                  [id number]     (Continued)



   -----------------------------------------------------------------------------
---------------



   



   



   Specimen: [id number]    Collected: 16-  Received: 
    (Continued)



   



   -----------------------------------------------------------------------------
---------------



   Procedure                         Result                         Reported   
        Site



   -----------------------------------------------------------------------------
---------------



   O P: Giardia/Cryptospor Screen  Final   (continued)              842



   assay.  One negative specimen does not rule out the



   possibility of a parasitic infection. To improve detection



   it is recommended that three specimens be collected on



   separate days over a period of not more than seven



   days. The use of colonic washes, aspirates or other diluted



   sample types has not been established and could affect the



   performance of the assay. Stool samples contaminated with an



   oily or particulate base (eg. Barium, mineral oil etc.)



   could interfere with the test and are not recommended.



   



   -----------------------------------------------------------------------------
---------------



   * ML - [facility] LAB ([facility])



   .



   



   



   



   



   



   



   



   



   



   



   



   



   



   



   



   



   



   



   



   



   



   



   



   



   



   ** END OF REPORT **



   



   * ML=Testing performed at Main Lab



   DEPARTMENT OF PATHOLOGY,  [address] [place] [address]



   Phone # [phone]      Fax #[phone]



   Francesco Camejo M.D. Director     [facility] # [id number]

 

 18  *******Because ethnic data is not always readily available,



   this report includes an eGFR for both -Americans and



   non- Americans.****



   The National Kidney Disease Education Program (NKDEP) does



   not endorse the use of the MDRD equation for patients that



   are not between the ages of 18 and 70, are pregnant, have



   extremes of body size, muscle mass, or nutritional status,



   or are non- or non-.



   According to the National Kidney Foundation, irrespective of



   diagnosis, the stage of the disease is based on the level of



   kidney function:



   Stage Description                      GFR(mL/min/1.73 m(2))



   1     Kidney damage with normal or decreased GFR       90



   2     Kidney damage with mild decrease in GFR          60-89



   3     Moderate decrease in GFR                         30-59



   4     Severe decrease in GFR                           15-29



   5     Kidney failure                       <15 (or dialysis)

 

 19  HDL Interpretation:



   Undesirable: High Risk:  Less than 40 mg/dL



   Desirable:  Low Risk:  Greater than 60 mg/dL

 

 20  LDL Interpretation:



   Low Risk Optimal Level:  LDL Less than 100 mg/dL



   Near or Above Optimal:  -129 mg/dL



   Borderline High Risk:  -159 mg/dL



   High Risk:  -189 mg/dL



   Very High Risk:  LDL Greater than 189 mg/dL

 

 21  Recommended INR for Patients



   on Oral Anticoagulants



   Prophylaxis                       2.0 - 3.0



   Treatment of thrombosis           2.0 - 3.0



   Prevention of embolism            2.0 - 3.0



   Prevention of embolism from



   prosthetic heart valves         2.5 - 3.5

 

 22  DIAGNOSIS,TREATMENT,AND THERAPY MUST BE BASED ON THE INR



   VALUE ALONE.

 

 23  Lymphopenia %



   Basophilia %

 

 24  Anion gap measurement may be of limited value in the



   presence of any alkalosis, especially in a combined acid



   base disorder.



   .

 

 25  A metabolite of Naproxen, O-desmethylnaproxen, has been



   shown to interfere with the Jendrassik-Gregg method for



   measuring total bilirubin.  Samples from patients who have



   taken Naproxen have shown spurious elevation in total



   bilirubin levels.

 

 26  *******Because ethnic data is not always readily available,



   this report includes an eGFR for both -Americans and



   non- Americans.****



   The National Kidney Disease Education Program (NKDEP) does



   not endorse the use of the MDRD equation for patients that



   are not between the ages of 18 and 70, are pregnant, have



   extremes of body size, muscle mass, or nutritional status,



   or are non- or non-.



   According to the National Kidney Foundation, irrespective of



   diagnosis, the stage of the disease is based on the level of



   kidney function:



   Stage Description                      GFR(mL/min/1.73 m(2))



   1     Kidney damage with normal or decreased GFR       90



   2     Kidney damage with mild decrease in GFR          60-89



   3     Moderate decrease in GFR                         30-59



   4     Severe decrease in GFR                           15-29



   5     Kidney failure                       <15 (or dialysis)

 

 27  Recommended INR for Patients



   on Oral Anticoagulants



   Prophylaxis                       2.0 - 3.0



   Treatment of thrombosis           2.0 - 3.0



   Prevention of embolism            2.0 - 3.0



   Prevention of embolism from



   prosthetic heart valves         2.5 - 3.5

 

 28  DIAGNOSIS,TREATMENT,AND THERAPY MUST BE BASED ON THE INR



   VALUE ALONE.

 

 29  Neutrophilia %



   Lymphopenia %

 

 30  NON FASTING

 

 31  CHOLESTEROL INTERPRETATION:



   Desirable:  Less than 200 MG/DL



   Borderline-High Risk:  200-239 MG/DL



   High-Risk:  240 MG/DL and over

 

 32  HDL INTERPRETATION:



   Undesirable: High Risk:  Less than 40 MG/DL



   Desirable:  Low Risk:  Greater than 60 MG/DL

 

 33  LDL INTERPRETATION:



   Low Risk Optimal Level:  LDL Less than 100 MG/DL



   Near or Above Optimal:  -129 MG/DL



   Borderline High Risk:  -159 MG/DL



   High Risk:  -189 MG/DL



   Very High Risk:  LDL Greater than 189 MG/DL

 

 34  Anion gap measurement may be of limited value in the



   presence of any alkalosis, especially in a combined acid



   base disorder.



   .

 

 35  A metabolite of Naproxen, O-desmethylnaproxen, has been



   shown to interfere with the Jendrassik-Gregg method for



   measuring total bilirubin.  Samples from patients who have



   taken Naproxen have shown spurious elevation in total



   bilirubin levels.

 

 36  *******Because ethnic data is not always readily available,



   this report includes an eGFR for both -Americans and



   non- Americans.****



   The National Kidney Disease Education Program (NKDEP) does



   not endorse the use of the MDRD equation for patients that



   are not between the ages of 18 and 70, are pregnant, have



   extremes of body size, muscle mass, or nutritional status,



   or are non- or non-.



   According to the National Kidney Foundation, irrespective of



   diagnosis, the stage of the disease is based on the level of



   kidney function:



   Stage Description                      GFR(mL/min/1.73 m(2))



   1     Kidney damage with normal or decreased GFR       90



   2     Kidney damage with mild decrease in GFR          60-89



   3     Moderate decrease in GFR                         30-59



   4     Severe decrease in GFR                           15-29



   5     Kidney failure                       <15 (or dialysis)

 

 37  100^,000 ORGANISMS/ML (MANY)^CCU







Procedures







 Date  Code  Description  Status

 

 2017  32125  Spirometry-Bronchospasm Evaluation, Before & After  
Completed



     Bronchodilator  

 

 2014  17151  Removal-Impacted Cerumen  Completed

 

 2012  38142  Removal-Impacted Cerumen  Completed

 

 2011  44671  Removal-Impacted Cerumen  Completed

 

 2009  35009942  Mammogram  Completed

 

 2008  66084629  Colonoscopy  Completed







Encounters







 Type  Date  Location  Provider  Dx  Diagnosis

 

 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 
dementia



   11:00a    MD    without behavioral



           disturbance









 I10  Essential (primary) hypertension

 

 R44.1  Visual hallucinations









 Office Visit  2019 10:30a  Main Office  Abraham Roberson  N39.0  
Urinary tract



       MD    infection, site



           not specified









 R41.82  Altered mental status, unspecified









 Office Visit  2019  9:15a  Main Office  Abraham Roberson,  I10  
Essential (primary)



       MD    hypertension









 R44.1  Visual hallucinations









 Office Visit  2018  2:15p  Main Office  Abraham Roberson  J44.9  Chronic



       MD    obstructive



           pulmonary disease,



           unspecified

 

 Office Visit  2018  4:30p  Main Office  HARISH Velez44.9  Chronic



       NP    obstructive



           pulmonary disease,



           unspecified

 

 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  
Dvtrcli of lg int



       MD    w perforation and



           abscess w bleeding









 I10  Essential (primary) hypertension









 Office Visit  06/15/2017  2:15p  Main Office  Abraham Roberson  R19.7  
Diarrhea,



       MD    unspecified









 J44.9  Chronic obstructive pulmonary disease, unspecified









 Office Visit  2017  3:45p  Main Office  John MUHAMMAD  R19.7  Diarrhea,



       Storm, FNP-C    unspecified

 

 Office Visit  2017 11:15a  Main Office  Abraham Upton 
obstructive



       MD Karol    pulmonary disease,



           unspecified

 

 Office Visit  2017  3:00p  Main Office  Abraham ROGERS  Cough



       MD Karol    









 J44.9  Chronic obstructive pulmonary disease, unspecified









 Office Visit  2016 10:45a  Main Office  Abraham Roberson,  K59.00  
Constipation,



       MD    unspecified









 Z12.11  Encounter for screening for malignant neoplasm of colon









 Office Visit  2016 11:00a  Main Office  Ryanne Lewis,  H61.23  
Impacted cerumen,



       FNP-C    bilateral

 

 Office Visit  2014  9:30a  Main Office  Nica Griggs,  V70.0  
Examination



       M.D., R.D.    General Medical



           Routine AT Health



           Care Facility









 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

 

 380.4  Impacted Cerumen









 Office Visit  2013  3:00p  Main Office  Ryanne Lewis,  785.6  Lymph 
Nodes



       FNP-C    Enlargement

 

 Office Visit  2013  4:45p  Main Office  Shawnti R.  380.22  Otitis 
Externa



       Storm, FNP-C    Other Acute

 

 Office Visit  2012  2:30p  Main Office  Nica Griggs,  401.1  
Hypertension Benign



       M.D., R.D.    









 783.21  Loss Of Weight

 

 305.1  Tobacco Use Disorder

 

 009.1  Colitis Enteritis & Gastroenteritis Presumed Infectious Orig

 

 380.4  Impacted Cerumen

 

 562.11  Diverticulitis Colon W/O Hemorrhage









 Office Visit  2011  1:30p  Main Office  Nica Griggs,  V70.0  
Examination General



       M.D., R.D.    Medical Routine AT



           Health Care



           Facility









 401.1  Hypertension Benign

 

 783.21  Loss Of Weight

 

 305.1  Tobacco Use Disorder

 

 380.4  Impacted Cerumen

 

 V76.51  Special Screening For Malignant Neoplasms Colon









 Office Visit  2011 10:45a  Main Office  Nica Griggs,  401.1  
Hypertension Benign



       M.MARY JANE, R.D.    









 783.21  Loss Of Weight







Plan of Treatment

2019 - Abraham Roberson MDI10 Essential (primary) hypertensionComments:
Trial off lisinopril. She does have COPD, but the current cough is more nagging 
and much more frequent. If she feels better off lisinopril, we will send in a 
script for an alternative medication, probably an ARB.

## 2019-06-19 NOTE — ED
Lower Extremity





- HPI Summary


HPI Summary: 





 Pt is an 82 y/o F presenting to [facility] with her daughter for a CC of a R thigh 

injury sustained on 6/15/19. The pts daughter reported that her sister 

witnessed her mother fall but does not know exactly how it happened. The pt 

stated that she fell in her kitchen and landed on her R side. The pain was not 

present when she fell. The pt stated that the pain increased since the onset to 

the point where it became unbearable to walk. She denies any other injuries and 

reports no spinal pain or head pain. She denies any CP, abdominal pain, fevers, 

dysuria, hematuria and N/V/D. She states that weight bearing and ambulation 

cause an increase in pain along her R thigh and she reports no alleviating 

symptoms. She had a surgery to her R side 9 years ago for her femur. She 

refuses any opioids. 





- History of Current Complaint


Chief Complaint: EDExtremityLower


Stated Complaint: FELL PAIN IN RT HIP/LEG PER PT


Time Seen by Provider: 06/19/19 13:27


Hx Obtained From: Patient, Family/Caretaker - daughter


Mechanism Of Injury: Fall From A Standing Position - in her kitchen, fell onto 

her R side


Onset of Pain: Days - Pain started 6/18/19, Post Accident


Onset/Duration: Worse Since - this morning


Severity Initially: Moderate


Severity Currently: Severe


Pain Intensity: 10


Pain Scale Used: 0-10 Numeric


Timing: Constant


Location: Other - R anterior thigh, radiates to her posterior pelvis


Associated Signs And Symptoms: Positive: Negative - CP, abdominal pain, N/V/D, 

fevers, dysuria, hematuria, spinal pain or head pain., Other - R anterior thigh 

pain, R posterior pelvis pain.  Negative: Abdominal Pain, Knee Pain


Aggravating Factor(s): Standing, Movement, Weight Bearing


Alleviating Factor(s): Rest


Able to Bear Weight: Yes





- Allergies/Home Medications


Allergies/Adverse Reactions: 


 Allergies











Allergy/AdvReac Type Severity Reaction Status Date / Time


 


Sulfa (Sulfonamide Allergy  See Comment Verified 06/19/19 12:26





Antibiotics)     














PMH/Surg Hx/FS Hx/Imm Hx


Previously Healthy: No


Endocrine/Hematology History: 


   Denies: Hx Diabetes


Cardiovascular History: 


   Denies: Hx Hypertension, Hx Pacemaker/ICD


Sensory History: Reports: Hx Contacts or Glasses, Hx Hearing Problem - [place]


   Denies: Hx Hearing Aid


Opthamlomology History: Reports: Hx Contacts or Glasses


Psychiatric History: 


   Denies: Hx Panic Disorder





- Surgical History


Surgery Procedure, Year, and Place: RIGHT HIP.  CATARACTS.  RT LEG SURGERY WITH 

NADIRA AND 7 SCREWS


Infectious Disease History: No


Infectious Disease History: 


   Denies: Traveled Outside the [place] in Last 30 Days





- Family History


Known Family History: 


   Negative: Cardiac Disease, Diabetes





- Social History


Alcohol Use: None


Hx Substance Use: No


Substance Use Type: Reports: None


Hx Tobacco Use: No


Smoking Status (MU): Former Smoker





Review of Systems


Negative: Fever


Negative: Abdominal Pain, Vomiting, Diarrhea, Nausea


Negative: dysuria, hematuria


Musculoskeletal: Negative - Neck pain/spinal pain, and head pain


Positive: Other - R anteriot thigh pain, R posterior pelvis


All Other Systems Reviewed And Are Negative: Yes





Physical Exam





- Summary


Physical Exam Summary: 





Constitutional: Well-developed, Well-nourished, Alert. (-) Distressed


Skin: Warm, Dry


HENT: Normocephalic; Atraumatic


Eyes: Conjunctiva normal


Neck: Musculoskeletal ROM normal neck. (-) JVD, (-) Stridor, (-) Tracheal 

deviation


Cardio: Rhythm regular, rate normal, Heart sounds normal; Intact distal pulses; 

The pedal pulses are 2+ and symmetric. Radial pulses are 2+ and symmetric. (-) 

Murmur, good pulses


Pulmonary/Chest wall: Effort normal. (-) Respiratory distress, (-) Wheezes, (-) 

Rales


Abd: Soft, (-) tenderness, (-) Distension, (-) Guarding, (-) Rebound


Musculoskeletal: (-) Edema, tender to palpation of her lower back superior 

gluteal over the Iliac crest, hip is non tender, tender to palpation to one 

spot 2/3 down her femur. Internal rotation causes pain in her r leg and lower 

back


Lymph: (-) Cervical adenopathy


Neuro: Alert, Oriented x3, neurovascular sensations are intact


Psych: Mood and affect Normal








tender to palpation of her lower back superior gluteal over the Iliac crest, 

hip is non tender, tender to palpation to one spot 2/3 down her femur. Internal 

rotation causes pain in her r leg and lower back, good pulses, neurovascular 

sensations are intact





Triage Information Reviewed: Yes


Vital Signs On Initial Exam: 


 Initial Vitals











Temp Pulse Resp BP Pulse Ox


 


 98.3 F   82   16   200/105   95 


 


 06/19/19 12:22  06/19/19 12:22  06/19/19 12:22  06/19/19 12:22  06/19/19 12:22











Vital Signs Reviewed: Yes





Diagnostics





- Vital Signs


 Vital Signs











  Temp Pulse Resp BP Pulse Ox


 


 06/19/19 12:22  98.3 F  82  16  200/105  95














- Laboratory


Lab Statement: Any lab studies that have been ordered have been reviewed, and 

results considered in the medical decision making process.





- Radiology


  ** Femur X-Ray


Radiology Interpretation Completed By: Radiologist


Summary of Radiographic Findings: 1.  OSTEOPENIA.  2.  OSTEOARTHRITIS.  3.  

STATUS POST INTERNAL FIXATION OF THE RIGHT FEMUR.  4.  NO RADIOGRAPHIC EVIDENCE 

FOR HIP FRACTURE.  Ed physician has reviewed this report





  ** Hip/Pelvis X-Ray


Radiology Interpretation Completed By: Radiologist


Summary of Radiographic Findings: 1.  OSTEOPENIA.  2.  OSTEOARTHRITIS.  3.  

STATUS POST INTERNAL FIXATION OF THE RIGHT FEMUR.  4.  NO RADIOGRAPHIC EVIDENCE 

FOR HIP FRACTURE.  ED physician has reviewed this report.





Re-Evaluation





- Re-Evaluation


  ** First Eval


Re-Evaluation Time: 15:59


Change: Improved


Comment: Pt was informed of her X-Ray findings and a discharge and plan was 

discussed. The pt is agreeable.





Lower Extremity Course/Dx





- Course


Course Of Treatment: Pt is an 82 y/o F presenting to [facility] with her daughter 

for a CC of a R thigh injury sustained on 6/15/19. The pts daughter reported 

that her sister witnessed her mother fall but does not know exactly how it 

happened. The pt stated that she fell in her kitchen and landed on her R side. 

The pain was not present when she fell. The pt stated that the pain increased 

since the onset to the point where it became unbearable to walk.  During her PE 

the pt is found to have the following: Tender to palpation of her lower back 

superior gluteal over the Iliac crest, hip is non tender, tender to palpation 

to one spot 2/3 down her femur. Internal rotation causes pain in her r leg and 

lower back, good pulses, neurovascular sensations are intact.  She received a 

femur X-Ray that shows: 1.  OSTEOPENIA. 2.  OSTEOARTHRITIS.  3.  STATUS POST 

INTERNAL FIXATION OF THE RIGHT FEMUR. 4.  NO RADIOGRAPHIC EVIDENCE FOR HIP 

FRACTURE. She also received a Hip/Pelvis X-Ray that shows: 1.  OSTEOPENIA. 2.  

OSTEOARTHRITIS. 3.  STATUS POST INTERNAL FIXATION OF THE RIGHT FEMUR. 4.  NO 

RADIOGRAPHIC EVIDENCE FOR HIP FRACTURE.  She was given APAP 650 mg during her 

ED course.  She will be discharged home with instructions to follow up with her 

PCP in 2-3 days and return to the ED with any new or worsening symptoms. Her Dx 

on discharge is hip contusion and leg contusion.





- Diagnoses


Provider Diagnoses: 


 Contusion, hip, Contusion of leg








Discharge





- Sign-Out/Discharge


Documenting (check all that apply): Patient Departure - discharge


Patient Received Moderate/Deep Sedation with Procedure: No





- Discharge Plan


Condition: Stable


Disposition: HOME


Prescriptions: 


Meloxicam [Qmiiz Odt] 15 mg PO DAILY #15 tab.rapdis


Patient Education Materials:  Musculoskeletal Pain (ED), Hip Contusion (ED)


Print Language: ENGLISH


Referrals: 


Abraham Roberson MD [Primary Care Provider] - 


Isabella Phelan MD [Medical Doctor] - 





- Billing Disposition and Condition


Condition: STABLE


Disposition: Home





- Attestation Statements


Document Initiated by Navdeep: Yes


Documenting Scribe: Milan Birmingham


Provider For Whom Navdeep is Documenting (Include Credential): Izabel Serna MD


Scribe Attestation: 


IMilan, scribed for Izabel Pimentel MD on 06/19/19 at 

2158. 


Scribe Documentation Reviewed: Yes


Provider Attestation: 


The documentation as recorded by the Milan waters accurately reflects 

the service I personally performed and the decisions made by me, Izabel Pimentel MD


Status of Scribe Document: Viewed

## 2019-09-23 ENCOUNTER — HOSPITAL ENCOUNTER (EMERGENCY)
Dept: HOSPITAL 25 - ED | Age: 84
Discharge: LEFT BEFORE DISCHARGE | End: 2019-09-23
Payer: MEDICARE

## 2019-09-23 VITALS — SYSTOLIC BLOOD PRESSURE: 166 MMHG | DIASTOLIC BLOOD PRESSURE: 76 MMHG

## 2019-09-23 DIAGNOSIS — K92.1: ICD-10-CM

## 2019-09-23 DIAGNOSIS — Z88.2: ICD-10-CM

## 2019-09-23 DIAGNOSIS — Z79.82: ICD-10-CM

## 2019-09-23 DIAGNOSIS — Z79.899: ICD-10-CM

## 2019-09-23 DIAGNOSIS — Z53.21: Primary | ICD-10-CM

## 2019-09-23 PROCEDURE — 99282 EMERGENCY DEPT VISIT SF MDM: CPT

## 2019-09-24 ENCOUNTER — HOSPITAL ENCOUNTER (EMERGENCY)
Dept: HOSPITAL 25 - ED | Age: 84
Discharge: HOME | End: 2019-09-24
Payer: MEDICARE

## 2019-09-24 VITALS — DIASTOLIC BLOOD PRESSURE: 67 MMHG | SYSTOLIC BLOOD PRESSURE: 145 MMHG

## 2019-09-24 DIAGNOSIS — R10.30: ICD-10-CM

## 2019-09-24 DIAGNOSIS — Z87.891: ICD-10-CM

## 2019-09-24 DIAGNOSIS — Z88.2: ICD-10-CM

## 2019-09-24 DIAGNOSIS — K52.9: Primary | ICD-10-CM

## 2019-09-24 LAB
ALBUMIN SERPL BCG-MCNC: 4.1 G/DL (ref 3.2–5.2)
ALBUMIN/GLOB SERPL: 1.3 {RATIO} (ref 1–3)
ALP SERPL-CCNC: 81 U/L (ref 34–104)
ALT SERPL W P-5'-P-CCNC: 11 U/L (ref 7–52)
ANION GAP SERPL CALC-SCNC: 8 MMOL/L (ref 2–11)
AST SERPL-CCNC: 15 U/L (ref 13–39)
BASOPHILS # BLD AUTO: 0.1 10^3/UL (ref 0–0.2)
BUN SERPL-MCNC: 20 MG/DL (ref 6–24)
BUN/CREAT SERPL: 25.6 (ref 8–20)
CALCIUM SERPL-MCNC: 9.3 MG/DL (ref 8.6–10.3)
CHLORIDE SERPL-SCNC: 103 MMOL/L (ref 101–111)
EOSINOPHIL # BLD AUTO: 0.1 10^3/UL (ref 0–0.6)
GLOBULIN SER CALC-MCNC: 3.2 G/DL (ref 2–4)
GLUCOSE SERPL-MCNC: 107 MG/DL (ref 70–100)
HCO3 SERPL-SCNC: 27 MMOL/L (ref 22–32)
HCT VFR BLD AUTO: 40 % (ref 35–47)
HGB BLD-MCNC: 13.6 G/DL (ref 12–16)
LYMPHOCYTES # BLD AUTO: 1.2 10^3/UL (ref 1–4.8)
MCH RBC QN AUTO: 29 PG (ref 27–31)
MCHC RBC AUTO-ENTMCNC: 34 G/DL (ref 31–36)
MCV RBC AUTO: 87 FL (ref 80–97)
MONOCYTES # BLD AUTO: 1.1 10^3/UL (ref 0–0.8)
NEUTROPHILS # BLD AUTO: 6.7 10^3/UL (ref 1.5–7.7)
NRBC # BLD AUTO: 0 10^3/UL
NRBC BLD QL AUTO: 0
PLATELET # BLD AUTO: 266 10^3/UL (ref 150–450)
POTASSIUM SERPL-SCNC: 3.4 MMOL/L (ref 3.5–5)
PROT SERPL-MCNC: 7.3 G/DL (ref 6.4–8.9)
RBC # BLD AUTO: 4.63 10^6 /UL (ref 3.7–4.87)
SODIUM SERPL-SCNC: 138 MMOL/L (ref 135–145)
WBC # BLD AUTO: 9.1 10^3/UL (ref 3.5–10.8)

## 2019-09-24 PROCEDURE — 80053 COMPREHEN METABOLIC PANEL: CPT

## 2019-09-24 PROCEDURE — 86900 BLOOD TYPING SEROLOGIC ABO: CPT

## 2019-09-24 PROCEDURE — 82270 OCCULT BLOOD FECES: CPT

## 2019-09-24 PROCEDURE — 86850 RBC ANTIBODY SCREEN: CPT

## 2019-09-24 PROCEDURE — 86901 BLOOD TYPING SEROLOGIC RH(D): CPT

## 2019-09-24 PROCEDURE — 83605 ASSAY OF LACTIC ACID: CPT

## 2019-09-24 PROCEDURE — 99284 EMERGENCY DEPT VISIT MOD MDM: CPT

## 2019-09-24 PROCEDURE — 36415 COLL VENOUS BLD VENIPUNCTURE: CPT

## 2019-09-24 PROCEDURE — 74177 CT ABD & PELVIS W/CONTRAST: CPT

## 2019-09-24 PROCEDURE — 85025 COMPLETE CBC W/AUTO DIFF WBC: CPT

## 2019-09-24 PROCEDURE — 86140 C-REACTIVE PROTEIN: CPT

## 2019-09-24 PROCEDURE — 93005 ELECTROCARDIOGRAM TRACING: CPT

## 2019-09-24 PROCEDURE — 96360 HYDRATION IV INFUSION INIT: CPT

## 2019-09-24 PROCEDURE — 83690 ASSAY OF LIPASE: CPT

## 2019-09-24 PROCEDURE — 96361 HYDRATE IV INFUSION ADD-ON: CPT

## 2019-09-24 NOTE — ED
Abdominal Pain/Female





- HPI Summary


HPI Summary: 





Pt is an 85 y/o F presenting to the ED for a chief complaint of lower abdominal 

pain. Pts daughter brought her to the ER after having black stools for one 

week. The pain improved yesterday initially but came back today. It is worsened 

before a bowel movement. Pt previously went to [facility] for 

bright red blood in her stool last year, where she had the bleed cauterized. Pt 

states this time, it is black stool rather than bright red blood. She denies 

fever.


Pt takes baby aspirin and last had a colonoscopy one year ago before her GI 

bleed. Pt states her GI physician told her she no longer could have 

colonoscopies d/t increased risk. Pt has a PMHx of diverticulitis and an 8 ft 

colon resection.  





- History of Current Complaint


Chief Complaint: EDAbdPain


Stated Complaint: STOMACH PAINS/BLOOD IN STOOL PER PT


Time Seen by Provider: 09/24/19 07:41


Hx Obtained From: Patient, Family/Caretaker


Onset/Duration: Sudden Onset, Lasting Minutes, Lasting Days


Timing: Days


Severity Initially: Moderate


Severity Currently: Mild


Pain Intensity: 0


Pain Scale Used: 0-10 Numeric


Location: Suprapubic


Aggravating Factor(s): Movement - Before bowel movement


Associated Signs and Symptoms: Positive: Blood in Stool.  Negative: Fever, 

Constipation


Allergies/Adverse Reactions: 


 Allergies











Allergy/AdvReac Type Severity Reaction Status Date / Time


 


Sulfa (Sulfonamide Allergy  See Comment Verified 09/24/19 07:40





Antibiotics)     











Home Medications: 


 Home Medications





Losartan TAB* 10 mg PO BEDTIME 09/24/19 [History Confirmed 09/24/19]











PMH/Surg Hx/FS Hx/Imm Hx


Previously Healthy: Yes


Endocrine/Hematology History: 


   Denies: Hx Diabetes


Cardiovascular History: 


   Denies: Hx Hypertension, Hx Pacemaker/ICD


Sensory History: Reports: Hx Contacts or Glasses, Hx Hearing Problem - [place]


   Denies: Hx Hearing Aid


Opthamlomology History: Reports: Hx Contacts or Glasses


Psychiatric History: 


   Denies: Hx Panic Disorder





- Surgical History


Surgery Procedure, Year, and Place: RIGHT HIP.  CATARACTS.  RT LEG SURGERY WITH 

NADIRA AND 7 SCREWS


Infectious Disease History: No


Infectious Disease History: 


   Denies: Traveled Outside the [place] in Last 30 Days





- Family History


Known Family History: 


   Negative: Cardiac Disease, Diabetes





- Social History


Alcohol Use: None


Hx Substance Use: No


Substance Use Type: Reports: None


Hx Tobacco Use: No


Smoking Status (MU): Former Smoker





Review of Systems


Negative: Fever


Positive: Abdominal Pain - Before bowel movements, Other - Melena


All Other Systems Reviewed And Are Negative: Yes





Physical Exam





- Summary


Physical Exam Summary: 





VITAL SIGNS: Reviewed.


GENERAL: Patient is a well-developed and nourished (FEMALE) who is lying 

comfortable in the stretcher. Patient is not in any acute respiratory distress.


HEAD AND FACE: No signs of trauma. No ecchymosis, hematomas or skull 

depressions. No sinus tenderness.


EYES: PERRLA, EOMI x 2, No injected conjunctiva, no nystagmus.


EARS: Hearing grossly intact. Ear canals and tympanic membranes are within 

normal limits.


MOUTH: Oropharynx within normal limits.


NECK: Supple, trachea is midline, no adenopathy, no JVD, no carotid bruit, no c-

spine tenderness, neck with full ROM.


CHEST: Symmetric, no tenderness at palpation.


LUNGS: Clear to auscultation bilaterally. No wheezing or crackles.


CVS: Regular rate and rhythm, S1 and S2 present, no murmurs or gallops 

appreciated.


ABDOMEN: Soft. No signs of distention. No rebound, no guarding, and no masses 

palpated. Bowel sounds are normal. LUQ tenderness. 


EXTREMITIES: FROM in all major joints, no edema, no cyanosis or clubbing.


NEURO: Alert and oriented x 3. No acute neurological deficits. Speech is normal 

and follows commands.


SKIN: Dry and warm.


RECTAL: No hemorrhoids, no gross blood or melena.  


Triage Information Reviewed: Yes


Vital Signs On Initial Exam: 


 Initial Vitals











Temp Pulse Resp BP Pulse Ox


 


 98 F   116   26   163/95   96 


 


 09/24/19 07:26  09/24/19 07:26  09/24/19 07:26  09/24/19 07:26  09/24/19 07:26











Vital Signs Reviewed: Yes





Diagnostics





- Vital Signs


 Vital Signs











  Temp Pulse Resp BP Pulse Ox


 


 09/24/19 07:26  98 F  116  26  163/95  96














- Laboratory


Result Diagrams: 


 09/24/19 08:14





 09/24/19 08:14


Lab Statement: Any lab studies that have been ordered have been reviewed, and 

results considered in the medical decision making process.





- CT


  ** Abdomen/Pelvis CT


CT Interpretation Completed By: Radiologist


Summary of CT Findings: Abdomen/Pelvis CT IMPRESSION:  1.  THERE IS DIFFUSE 

MUCOSAL THICKENING OF THE DISTAL COLON. THE DIFFERENTIAL INCLUDES COLITIS. 

THERE IS MORE FOCAL CIRCUMFERENTIAL MUCOSAL THICKENING OF THE ASCENDING COLON.  

THIS MAY REPRESENT ANOTHER AREA OF INFLAMMATORY COLITIS, THOUGH COLONIC MUCOSAL 

NEOPLASM.  MAY GIVE A SIMILAR APPEARANCE. RECOMMEND CONSIDERATION OF 

CORRELATION WITH DIRECT.  VISUALIZATION.  2.  DIVERTICULOSIS.  3.  

ATHEROSCLEROSIS.  4.  CHOLELITHIASIS.  5.  DIVERTICULUM OF THE SECOND STAGE OF 

THE DUODENUM.  Reviewed by ED physician.





- EKG


  ** 07:57


Cardiac Rate: NL - 89 BPM


EKG Rhythm: Sinus Rhythm


ST Segment: Normal


Ectopy: None


EKG Comparison: No Significant Change


Summary of EKG Findings: EKG at 07:57 shows a sinus rhythm of 89 BPM, no STEMI, 

normal axis similar to 12/09/2011.  EKG reviewed and interpreted by ED 

physician.





Re-Evaluation





- Re-Evaluation


  ** 1st re-eval


Re-Evaluation Time: 11:42


Comment: At 11:42, I discussed results of CT with pt. I will be consulting GI.





Abdominal Pain Fem Course/Dx





- Course


Course Of Treatment: Patient is an 84-year-old female who presents to the 

emergency department with a chief complaint of having lower abdominal pain in 

the left lower quadrant, and black stools.  Blood work without any significant 

abnormality except potassium level 3.4, glucose 107, and CRP of 36.59.  I 

performed a  rectal exam which showed no gross blood or melena.  However since 

the patient has some left lower quadrant tenderness I decided to do 

abdominopelvic CT to rule out diverticulitis.  Abdominopelvic CT impression:  

1. THERE IS DIFFUSE MUCOSAL THICKENING OF THE DISTAL COLON. THE DIFFERENTIAL 

INCLUDES.  COLITIS. THERE IS MORE FOCAL CIRCUMFERENTIAL MUCOSAL THICKENING OF 

THE ASCENDING COLON.  THIS MAY REPRESENT ANOTHER AREA OF INFLAMMATORY COLITIS, 

THOUGH COLONIC MUCOSAL NEOPLASM.  MAY GIVE A SIMILAR APPEARANCE. RECOMMEND 

CONSIDERATION OF CORRELATION WITH DIRECT.  VISUALIZATION.  2. DIVERTICULOSIS.  

3. ATHEROSCLEROSIS.  4. CHOLELITHIASIS.  5. DIVERTICULUM OF THE SECOND STAGE OF 

THE DUODENUM.  At this time I discussed my physical exam and findings with Dr. Devine from GI who came and assessed the patient.  After his assessment he 

recommended for the patient to be discharged home with indications for a bland 

diet and follow-up at his office.  I discussed my physical exam and findings 

with the patient as well as the plan and she agrees.  Patient also was 

recommended to return to the emergency department if she develops any other 

pain.  Patient is hemodynamically stable alert oriented 3.





- Diagnoses


Provider Diagnoses: 


 Colitis








Discharge ED





- Sign-Out/Discharge


Documenting (check all that apply): Patient Departure


Patient Received Moderate/Deep Sedation with Procedure: No





- Discharge Plan


Condition: Stable


Disposition: HOME


Referrals: 


Abraham Roberson MD [Primary Care Provider] - 


Additional Instructions: 


Upon returning home, make sure you follow a bland diet. 





Follow up with Dr. Devine within the next 2-3 days.





Return to the emergency department with any new or worsening symptoms.








- Billing Disposition and Condition


Condition: STABLE


Disposition: Home





- Attestation Statements


Document Initiated by Scribe: Yes


Documenting Scribe: Adelia Fernandes


Provider For Whom Navdeep is Documenting (Include Credential): Rashad Ponce MD. 


Scribe Attestation: 


I, Adelia Fernandes, scribed for Rashad Ponce MD.  on 09/25/19 at 

0733. 


Scribe Documentation Reviewed: Yes


Provider Attestation: 


The documentation as recorded by the scribe, Adelia Fernandes 

accurately reflects the service I personally performed and the decisions made 

by me, Rashad Ponce MD. 


Status of Scribe Document: Viewed





Consult


Consult: 





At 11:43 I consulted with Dr. Devine who will be coming up to see the pt. 





Dr. Devine recommended suggesting a bland diet to the patient, discharging her

, and having her follow up with him in outpatient. I informed the pt of this 

plan and she is agreeable.

## 2019-09-25 NOTE — CONS
GASTROENTEROLOGY CONSULT REPO                                                   
RT:DATE:  09/24/19 - EMERGENCY DEPT

                  

CONSULTING PHYSICIAN:  Rashad Ponce MD

 

REASON FOR CONSULT:  Lower abdominal pain and rectal bleeding.

 

HISTORY:  This 84-year-old woman brought to the emergency room by her daughter 
with whom she moved in 2 weeks ago after 8 years ago moving here from [place], [place].  The daughter assists in the history stating her mother has been diagnosed 
with vascular dementia though their discussions reveal that the daughter 
deferred to her mother's memory fairly often referencing her own cerebral 
aneurysm episodes 3 times.

 

The patient states that about a week ago she began noting some blood in the 
stool and she complained of some lower abdominal pain.  She was not overtly ill 
according to the daughter as there was no vomiting or fever.  She was eating 
regular food. The patient initially described seeing blood and then later the 
stool is described as black.  She actually came to the emergency room yesterday 
but due to multiple patients in the triage process, blood was drawn but she was 
never seen.  The patient is feeling somewhat better but stated that she wanted 
to know what was going on and therefore came back.  She had also contacted the 
office where she was seen 2 years ago to evaluate diarrhea and was told she 
should come to the emergency room first.

 

In general, she eats a regular diet without restrictions.  Her appetite has 
been good and weight is steady.  Her bowel habit is daily to every other day.  
They described it as somewhat constipated, but she is not taking any laxatives.
  There had been diarrhea workups in the past.  She had part of her colon 
removed in 2008 at [facility] for bleeding.  A year ago, she came to the 
emergency room with rectal bleeding and was transferred to [facility] where they 
state colon exam was done and "it was cauterized."  She has not been acutely 
ill since then.

 

PAST MEDICAL HISTORY:

1.  COPD - she quit smoking around 2010 (per history and physical here in 2011).

2.  Status post appendectomy in 1995.

3.  Status post sigmoid colectomy - 2008, records not available.

4.  Functional bowel habit changes - multiple stool studies 2016 and 2017 give 
differing results with positive fecal lactoferrin once, though heme-negative 
stool and the last specimen describing the stool is hard.  C. diff was negative 
x2.  Studies sent by Armida Reddy NP.

5.  Status post hip fracture with surgery December 2011.

6.  Hypertension.

7.  Urosepsis - March 2019 admission.

 

MEDICATIONS:

1.  ProAir inhaler.

2.  Ventolin inhaler.

3.  Aspirin 81 mg.

4.  Hydrochlorothiazide 25.

5.  Acidophilus.

 

FAMILY HISTORY:  Her father had lung cancer.  Her mother lived to old age.

 

SOCIAL HISTORY:  She lives with her daughter, Lidya Nelson in [place] 
and uses [facility] for primary care.  She is a retired 
.

 

REVIEW OF SYSTEMS:  No history of syncope, arrhythmias, MI, hemoptysis, TB, 
hepatitis, food allergies, renal stones, hematuria, skin rashes, or seizures.

 

PHYSICAL EXAM:  She is a slender, elderly woman, in no overt distress.  She 
appears quite calm.  Afebrile, pulse 87, respirations 24, blood pressure 145/
67.  HEENT exam is unremarkable.  There is no icterus, mucous membrane 
abnormalities, adenopathy.  Breath sounds are diminished bilaterally 
symmetrically.  There are no rales or rhonchi.  Heart sounds are regular.  
Breast and pelvic exams are deferred. Her abdomen is symmetric with a lower 
midline scar.  Bowel sounds are normal. Abdomen is soft without focal 
tenderness.  She had a spontaneous bowel movement, which was sludgy, loose, 
brown and without blood and stating "it's thickening up." Extremities show no 
edema or deformity.  Stool occult blood submitted was negative.

 

DIAGNOSTIC STUDIES/LAB DATA:  Hemoglobin 13.6, hematocrit 40, MCV 87, white 
count 9.1.  LFTs normal.  BUN 20, creatinine is 0.78.  CRP 36.  Albumin 4.1.

 

CT scan - thickening of the distal colon, diverticulosis, atherosclerosis, and 
gallstones.

 

IMPRESSION:  This 84-year-old woman has had loose stools described as being 
overtly bloody or being dark about a week.  The history is very vague although 
clearly there has been an acute illness.  At the moment, she seems to be 
improving.

 

Most likely she has had an episode of gastroenteritis or a mild episode of 
ischemic colitis.  The elevated CRP is most compatible with that as opposed to 
an episode of diverticulitis and/or diverticular bleeding in combination.

 

She ate regular food yesterday and appears stable and with a normal white count 
and being afebrile.  She can be discharged to a bland diet and outpatient 
followup.  No antibiotics are needed at the moment.

 

 [id number]/[facility] #: [id number]

CLE[facility]ATRA

## 2019-10-15 ENCOUNTER — HOSPITAL ENCOUNTER (INPATIENT)
Dept: HOSPITAL 25 - ED | Age: 84
LOS: 5 days | Discharge: HOME | DRG: 385 | End: 2019-10-20
Attending: INTERNAL MEDICINE | Admitting: HOSPITALIST
Payer: MEDICARE

## 2019-10-15 DIAGNOSIS — J44.9: ICD-10-CM

## 2019-10-15 DIAGNOSIS — N39.0: ICD-10-CM

## 2019-10-15 DIAGNOSIS — E87.6: ICD-10-CM

## 2019-10-15 DIAGNOSIS — Z66: ICD-10-CM

## 2019-10-15 DIAGNOSIS — K51.90: Primary | ICD-10-CM

## 2019-10-15 DIAGNOSIS — Z96.641: ICD-10-CM

## 2019-10-15 DIAGNOSIS — E43: ICD-10-CM

## 2019-10-15 DIAGNOSIS — Z80.1: ICD-10-CM

## 2019-10-15 DIAGNOSIS — Z80.42: ICD-10-CM

## 2019-10-15 DIAGNOSIS — Z88.2: ICD-10-CM

## 2019-10-15 DIAGNOSIS — Z87.891: ICD-10-CM

## 2019-10-15 DIAGNOSIS — F01.50: ICD-10-CM

## 2019-10-15 DIAGNOSIS — B96.20: ICD-10-CM

## 2019-10-15 DIAGNOSIS — E83.42: ICD-10-CM

## 2019-10-15 DIAGNOSIS — Z79.82: ICD-10-CM

## 2019-10-15 DIAGNOSIS — Z79.899: ICD-10-CM

## 2019-10-15 DIAGNOSIS — I10: ICD-10-CM

## 2019-10-15 DIAGNOSIS — K57.30: ICD-10-CM

## 2019-10-15 LAB
ALBUMIN SERPL BCG-MCNC: 3 G/DL (ref 3.2–5.2)
ALBUMIN/GLOB SERPL: 0.9 {RATIO} (ref 1–3)
ALP SERPL-CCNC: 67 U/L (ref 34–104)
ALT SERPL W P-5'-P-CCNC: 12 U/L (ref 7–52)
AMYLASE SERPL-CCNC: 12 U/L (ref 29–103)
ANION GAP SERPL CALC-SCNC: 9 MMOL/L (ref 2–11)
AST SERPL-CCNC: 16 U/L (ref 13–39)
BASOPHILS # BLD AUTO: 0.1 10^3/UL (ref 0–0.2)
BUN SERPL-MCNC: 12 MG/DL (ref 6–24)
BUN/CREAT SERPL: 17.9 (ref 8–20)
CALCIUM SERPL-MCNC: 8.1 MG/DL (ref 8.6–10.3)
CHLORIDE SERPL-SCNC: 99 MMOL/L (ref 101–111)
EOSINOPHIL # BLD AUTO: 0 10^3/UL (ref 0–0.6)
GLOBULIN SER CALC-MCNC: 3.3 G/DL (ref 2–4)
GLUCOSE SERPL-MCNC: 104 MG/DL (ref 70–100)
HCO3 SERPL-SCNC: 28 MMOL/L (ref 22–32)
HCT VFR BLD AUTO: 32 % (ref 35–47)
HGB BLD-MCNC: 11 G/DL (ref 12–16)
INR PPP/BLD: 1.39 (ref 0.82–1.09)
LYMPHOCYTES # BLD AUTO: 0.7 10^3/UL (ref 1–4.8)
MAGNESIUM SERPL-MCNC: 1.7 MG/DL (ref 1.9–2.7)
MCH RBC QN AUTO: 29 PG (ref 27–31)
MCHC RBC AUTO-ENTMCNC: 35 G/DL (ref 31–36)
MCV RBC AUTO: 84 FL (ref 80–97)
MONOCYTES # BLD AUTO: 1.1 10^3/UL (ref 0–0.8)
NEUTROPHILS # BLD AUTO: 5.9 10^3/UL (ref 1.5–7.7)
NRBC # BLD AUTO: 0 10^3/UL
NRBC BLD QL AUTO: 0
PLATELET # BLD AUTO: 404 10^3/UL (ref 150–450)
POTASSIUM SERPL-SCNC: 2.6 MMOL/L (ref 3.5–5)
PROT SERPL-MCNC: 6.3 G/DL (ref 6.4–8.9)
RBC # BLD AUTO: 3.77 10^6 /UL (ref 3.7–4.87)
SODIUM SERPL-SCNC: 136 MMOL/L (ref 135–145)
TROPONIN I SERPL-MCNC: 0.03 NG/ML (ref ?–0.04)
WBC # BLD AUTO: 7.9 10^3/UL (ref 3.5–10.8)

## 2019-10-15 PROCEDURE — 83630 LACTOFERRIN FECAL (QUAL): CPT

## 2019-10-15 PROCEDURE — 74177 CT ABD & PELVIS W/CONTRAST: CPT

## 2019-10-15 PROCEDURE — 0D9N8ZX DRAINAGE OF SIGMOID COLON, VIA NATURAL OR ARTIFICIAL OPENING ENDOSCOPIC, DIAGNOSTIC: ICD-10-PCS | Performed by: INTERNAL MEDICINE

## 2019-10-15 PROCEDURE — 0DDN8ZX EXTRACTION OF SIGMOID COLON, VIA NATURAL OR ARTIFICIAL OPENING ENDOSCOPIC, DIAGNOSTIC: ICD-10-PCS | Performed by: INTERNAL MEDICINE

## 2019-10-15 PROCEDURE — 88305 TISSUE EXAM BY PATHOLOGIST: CPT

## 2019-10-15 PROCEDURE — 96367 TX/PROPH/DG ADDL SEQ IV INF: CPT

## 2019-10-15 PROCEDURE — 87329 GIARDIA AG IA: CPT

## 2019-10-15 PROCEDURE — 87046 STOOL CULTR AEROBIC BACT EA: CPT

## 2019-10-15 PROCEDURE — 85610 PROTHROMBIN TIME: CPT

## 2019-10-15 PROCEDURE — 83690 ASSAY OF LIPASE: CPT

## 2019-10-15 PROCEDURE — 80048 BASIC METABOLIC PNL TOTAL CA: CPT

## 2019-10-15 PROCEDURE — 82272 OCCULT BLD FECES 1-3 TESTS: CPT

## 2019-10-15 PROCEDURE — 87086 URINE CULTURE/COLONY COUNT: CPT

## 2019-10-15 PROCEDURE — 99157 MOD SED OTHER PHYS/QHP EA: CPT

## 2019-10-15 PROCEDURE — 87186 SC STD MICRODIL/AGAR DIL: CPT

## 2019-10-15 PROCEDURE — 85027 COMPLETE CBC AUTOMATED: CPT

## 2019-10-15 PROCEDURE — 83880 ASSAY OF NATRIURETIC PEPTIDE: CPT

## 2019-10-15 PROCEDURE — 94640 AIRWAY INHALATION TREATMENT: CPT

## 2019-10-15 PROCEDURE — 96365 THER/PROPH/DIAG IV INF INIT: CPT

## 2019-10-15 PROCEDURE — 87899 AGENT NOS ASSAY W/OPTIC: CPT

## 2019-10-15 PROCEDURE — 82150 ASSAY OF AMYLASE: CPT

## 2019-10-15 PROCEDURE — 36415 COLL VENOUS BLD VENIPUNCTURE: CPT

## 2019-10-15 PROCEDURE — 99284 EMERGENCY DEPT VISIT MOD MDM: CPT

## 2019-10-15 PROCEDURE — 80053 COMPREHEN METABOLIC PANEL: CPT

## 2019-10-15 PROCEDURE — 99156 MOD SED OTH PHYS/QHP 5/>YRS: CPT

## 2019-10-15 PROCEDURE — 87328 CRYPTOSPORIDIUM AG IA: CPT

## 2019-10-15 PROCEDURE — 87493 C DIFF AMPLIFIED PROBE: CPT

## 2019-10-15 PROCEDURE — 87177 OVA AND PARASITES SMEARS: CPT

## 2019-10-15 PROCEDURE — 84484 ASSAY OF TROPONIN QUANT: CPT

## 2019-10-15 PROCEDURE — 86140 C-REACTIVE PROTEIN: CPT

## 2019-10-15 PROCEDURE — 85025 COMPLETE CBC W/AUTO DIFF WBC: CPT

## 2019-10-15 PROCEDURE — 87209 SMEAR COMPLEX STAIN: CPT

## 2019-10-15 PROCEDURE — 81003 URINALYSIS AUTO W/O SCOPE: CPT

## 2019-10-15 PROCEDURE — 96361 HYDRATE IV INFUSION ADD-ON: CPT

## 2019-10-15 PROCEDURE — 87077 CULTURE AEROBIC IDENTIFY: CPT

## 2019-10-15 PROCEDURE — 81015 MICROSCOPIC EXAM OF URINE: CPT

## 2019-10-15 PROCEDURE — 83735 ASSAY OF MAGNESIUM: CPT

## 2019-10-15 PROCEDURE — 87045 FECES CULTURE AEROBIC BACT: CPT

## 2019-10-15 PROCEDURE — 83605 ASSAY OF LACTIC ACID: CPT

## 2019-10-15 RX ADMIN — ALBUTEROL SULFATE PRN PUFF: 90 AEROSOL, METERED RESPIRATORY (INHALATION) at 16:20

## 2019-10-15 RX ADMIN — ALBUTEROL SULFATE PRN PUFF: 90 AEROSOL, METERED RESPIRATORY (INHALATION) at 21:41

## 2019-10-15 RX ADMIN — HEPARIN SODIUM SCH UNITS: 5000 INJECTION INTRAVENOUS; SUBCUTANEOUS at 21:39

## 2019-10-15 RX ADMIN — SODIUM CHLORIDE SCH MLS/HR: 900 IRRIGANT IRRIGATION at 15:37

## 2019-10-15 RX ADMIN — HEPARIN SODIUM SCH: 5000 INJECTION INTRAVENOUS; SUBCUTANEOUS at 16:51

## 2019-10-15 RX ADMIN — POTASSIUM CHLORIDE SCH MLS/HR: 200 INJECTION, SOLUTION INTRAVENOUS at 21:40

## 2019-10-15 RX ADMIN — POTASSIUM CHLORIDE SCH MLS/HR: 200 INJECTION, SOLUTION INTRAVENOUS at 13:31

## 2019-10-15 RX ADMIN — HEPARIN SODIUM SCH: 5000 INJECTION INTRAVENOUS; SUBCUTANEOUS at 16:44

## 2019-10-15 RX ADMIN — POTASSIUM CHLORIDE SCH MEQ: 20 SOLUTION ORAL at 21:40

## 2019-10-15 NOTE — XMS REPORT
Continuity of Care Document (CCD)

 Created on:2019



Patient:Artie Phelan

Sex:Female

:1935

External Reference #:[id number]





Demographics







 Address  [address]



   [place] [id number]

 

 Home Phone  [phone]

 

 Preferred Language  en

 

 Marital Status  Not  or 

 

 Caodaism Affiliation  Unknown

 

 Race  White

 

 Ethnic Group  Not  or 









Author







 Name  Joana Allen PA-C

 

 Address  [address] [place] [address]









Care Team Providers







 Name  Role  Phone

 

 Abraham Roberson MD  Care Team Information   [phone]









Problems







 Active Problems  Provider  Date

 

 Gastrointestinal tract finding  Joana Allen PA-C  Onset: 10/02/2019

 

 Melena  Joana Allen PA-C  Onset: 10/02/2019

 

 Digestive symptom  Joana Allen PA-C  Onset: 10/02/2019

 

 Abdominal pain  Joana Allen PA-C  Onset: 10/02/2019

 

 Slow transit constipation  Joana Allen PA-C  Onset: 2018

 

 Hemorrhage of large intestine with  Joana Allen PA-C  Onset: 2018



 diverticular disease of large intestine    

 

 Essential hypertension  Joana Allen PA-C  Onset: 10/02/2019







Social History







 Type  Date  Description  Comments

 

 Birth Sex    Unknown  

 

 ETOH Use    Rarely consumes alcohol  

 

 Tobacco Use  Start: Unknown End: Unknown  Patient is a former smoker  

 

 Smoking Status  Reviewed: 10/02/19  Patient is a former smoker  







Allergies, Adverse Reactions, Alerts







 Active Allergies  Reaction  Severity  Comments  Date

 

 Sulfa        07/10/2017







Medications







 Active Medications  SIG  Qnty  Indications  Ordering  Date



         Provider  

 

 Proair HFA  1-2 puffs every 6  8.500unit    Shad Roberson  



      108(90Base) mcg/Act  hours as needed  chirag gibbons MD  7



 Aerosol          

 

 Aspir-81  1 po qd  30tabs    Ilan Griggs  



    81mg Tablets DR evy MD  1



           

 

 Fiber Supplement        Unknown  



           0

 

 Miralax  1 scoop daily      Unknown  



   3350NF Powder          0



           

 

 Incruse Ellipta  Inhale One puff      Unknown  



           62.5mcg/Inh  By Mouth Every        0



 Aerosol  Day For Chronic        



   Obstructive Lung        



   Disease        

 

 Hydrochlorothiazide        Heetderks,Shad  



               12.5mg        rit,MD  0



 Capsules          

 

 Losartan Potassium        Heetderks,Shad  



              100mg        rit,MD  0



 Tablets          

 

 Meloxicam  Take One Tablet      Unknown  



     15mg Tablets  By Mouth Every        0



   Day        

 

 Lisinopril  Take One Tablet      Unknown  



      10mg Tablets  By Mouth Every        0



   Day        







Immunizations







 CPT Code  Status  Date  Vaccine  Lot #

 

 [id number]  Given  10/01/2003  Pneumovax  









 









 18785  Refused  2017  Influenza Virus Vaccine, Quadrivalent, Split, 
Preservative  



       Free  







Vital Signs







 Date  Vital  Result  Comment

 

 10/02/2019  8:15am  Height  63 inches  5'3"









 Weight  131.00 lb  

 

 BP Systolic  161 mmHg  

 

 BP Diastolic  79 mmHg  

 

 Heart Rate  98 /min  

 

 BMI (Body Mass Index)  23.2 kg/m2  









 2018  8:52am  Height  63 inches  5'3"









 Weight  128.00 lb  

 

 BP Systolic  148 mmHg  

 

 BP Diastolic  90 mmHg  

 

 Heart Rate  90 /min  

 

 BMI (Body Mass Index)  22.7 kg/m2  







Results







 Test  Date  Facility  Test  Result  H/L  Range  Note

 

 Laboratory test  10/03/2019  [facility]  [place]  SEE RESULT      1



 finding        BELOW      

 

 Stool Occult Blood  10/03/2019  [facility]  Stool Occult  SEE RESULT      2



 Diag      Blood, Diag  BELOW      









 1  SEE RESULT BELOW



   -----------------------------------------------------------------------------
---------------



   Name:  ARTIE PHELAN                 : 1935    Attend Dr: Joana MORRISON



   Acct:  [id number]  Unit: [id number]  AGE: 84            Location:  [facility]



   Reg:   10/03/19                        SEX: F             Status:    REG REF



   -----------------------------------------------------------------------------
---------------



   



   SPEC: [id number]         DANUTA:   10/03/    EDNA DR: Joana MORRISON



   REQ:  [id number]              RECD:   10/03/



   STATUS: RES



   _



   SOURCE: STOOL          SPDESC:



   ORDERED:  Occult Bl, Diag, Stool Culture



   



   -----------------------------------------------------------------------------
---------------



   Procedure                         Result                         Reported   
        Site



   -----------------------------------------------------------------------------
---------------



   Stool Culture



   PENDING



   



   Stool Specimen Description  Final                                10/03/19-
2006      ML



   Stool Color                 Brown



   Stool Form                  Formed



   Stool Consistency           Firm



   



   Shiga Toxin 1   2



   PENDING



   



   Stool Occult Blood (1)



   PENDING



   



   -----------------------------------------------------------------------------
---------------



   * ML - Main Lab



   .



   



   



   



   



   



   



   



   



   



   



   



   



   



   



   



   ** END OF REPORT **



   



   DEPARTMENT OF PATHOLOGY,  [address] [place] [address]



   Phone # [phone]      Fax #[phone]



   Francesco Camejo M.D. Director     [facility] # [id number]

 

 2  SEE RESULT BELOW



   -----------------------------------------------------------------------------
---------------



   Name:  ARTIE PHELAN                 : 1935    Attend Dr: Joana MORRISON



   Acct:  [id number]  Unit: [id number]  AGE: 84            Location:  [facility]



   Reg:   10/03/19                        SEX: F             Status:    REG REF



   -----------------------------------------------------------------------------
---------------



   



   SPEC: [id number]         DANUTA:   10/03/    SUBM DR: Joana MORRISON



   REQ:  [id number]              RECD:   10/03/



   STATUS: COMP



   _



   SOURCE: STOOL          SPDESC:



   ORDERED:  Occult Bl, Diag, Stool Culture



   COMMENTS: Verbal to ASHLEY WINCHESTER CDIFF FORMED STOOL by [id number]



   at 0823 on 10/04/19.



   



   



   -----------------------------------------------------------------------------
---------------



   Procedure                         Result                         Reported   
        Site



   -----------------------------------------------------------------------------
---------------



   Stool Culture  Final                                             10/05/19-
1211      ML



   



   Result                      No enteric pathogens isolated



   



   Testing for Salmonella, Shigella, Aeromonas, Plesiomonas,



   Yersinia and Campylobacter are included in a Stool Culture.



   



   Vibrio spp not routinely tested for in a stool culture.



   If testing is desired, please request specifically when



   placing test order.



   



   Sensitivities not routinely performed on stool isolates, as



   antibiotics may prolong the carriage rate of bacteria.



   Please contact the microbiology lab if sensitivities are



   required.



   



   Stool Specimen Description  Final                                10/03/19-
      ML



   Stool Color                 Brown



   Stool Form                  Formed



   Stool Consistency           Firm



   



   Shiga Toxin 1   2  Final                                         10/04/19-
      ML



   



   Organism 1                     Negative Shiga Toxin 1   2



   



   



   



   



   



   ** CONTINUED ON NEXT PAGE **



   



   DEPARTMENT OF PATHOLOGY,  [address] [place] [address]



   Phone # [phone]      Fax #[phone]



   Francesco Camejo M.D. Director     [facility] # [id number]



   



   



   



   -----------------------------------------------------------------------------
---------------



   Patient: PHELANPALOMO                  [id number]     (Continued)



   -----------------------------------------------------------------------------
---------------



   



   



   Specimen: [id number]    Collected: 10/03/  Received: 10/03/
    (Continued)



   



   -----------------------------------------------------------------------------
---------------



   Procedure                         Result                         Reported   
        Site



   -----------------------------------------------------------------------------
---------------



   Shiga Toxin 1   2  Final   (continued)                           10/04/19-




   Immunochromatographic Assay



   



   Stool Occult Blood (1)  Final                                    10/03/19-
      ML



   Stool Occult Blood          Positive



   



   Collection Date (1)         10/03/19



   



   -----------------------------------------------------------------------------
---------------



   * ML - Main Lab



   .



   



   



   



   



   



   



   



   



   



   



   



   



   



   



   



   



   



   



   



   



   



   



   



   



   



   



   



   



   ** END OF REPORT **



   



   DEPARTMENT OF PATHOLOGY,  [address] [place] [address]



   Phone # [phone]      Fax #[phone]



   Francesco Camejo M.D. Director     [facility] # [id number]







Procedures







 Description

 

 No Information Available







Medical Devices







 Description

 

 No Information Available







Encounters







 Description

 

 No Information Available







Assessments







 Date  Code  Description  Provider

 

 10/02/2019  R10.30  Lower abdominal pain, unspecified  TAMIKO Schwab

 

 10/02/2019  R19.4  Change in bowel habit  Joana Allen PA-C

 

 10/02/2019  K92.1  Melena  Joana Allen PA-C

 

 10/02/2019  R93.3  Abnormal findings on diagnostic imaging  Joana Allen PA-C



     of other parts of digestive tract  







Plan of Treatment

No Information Available



Functional Status







 Description

 

 No Information Available







Mental Status







 Description

 

 No Information Available







Referrals







 Description

 

 No Information Available

## 2019-10-15 NOTE — ED
GI/ HPI





- HPI Summary


HPI Summary: 


Patient is a 85 y/o F [facility] with complaints of diarrhea 

for the past month and low blood pressure at home this am. BP was not 

hypotensive per EMS and BP is slightly elevated on triage in the ED. Daughter, 

Lidya, who lives with the patient, had made the patient come in today, 10/15/19

, for evaluation. Patient states that food will "go right through me". Patient 

reports that she has had blood in her stool previously, and colonoscopy is 

scheduled soon. Patient is being followed by GI at [facility]. Daughter is concerned 

that pt will be too weak to go through the colonoscopy prep. Pt has hx C diff. 

She denies abdominal pain at present and states that she did not eat this 

morning. However, she notes that she will get abdominal pain after eating food 

and with her diarrhea. Stool is characterized as brown, watery, and with small 

bits of blood. Daughter notes that the patient has had progressively worsening 

generalized weakness. It is noted that the patient has lost around 6 lbs in the 

past month due to decreased appetite and diarrhea. Patient has been gaseous as 

well. No fever, vomiting, CP, SOB, HA, dizziness at present reported. However, 

daughter notes that the patient had complaints of HA a few days ago when going 

to sleep. Daughter states that the patient also had an episode of SOB and 

dizziness while ambulating within the past few days that required usage of her 

rescue inhaler. Patient had been evaluated in ED on 9/24, ABD/PEL CT was done 

this day. She was evaluated on 10/02 by GI. No hx C diff is reported (although 

RN states this in his initial note about pt). Patient has not been on any 

antibiotics recently, since March 2019 when she was on cephalexin for UTI.  Pt 

has not been in contact with anyone with similar Sx. PMHx of "shattered" femur, 

COPD, and dementia is reported. PSHx of femur repair noted, sigmoid colectomy. 

Father had throat cancer, brother had bone cancer. She denies alcohol, tobacco, 

or substance usage. Vitals in room are 93 pulse, o2 94, /77. Home 

medications and allergies are reviewed. She is on Valsartan 80 mg BID, HCTZ 25 

mg daily, Ellipta, and rescue inhaler. Patient only took Ellipta today. 

Daughter obtained the following vitals from patient at home: 10/14 1030 (106/60 

BP) 10/14 1600, (128/59 BP, 94 pulse), 10/14 1800 (107/53 BP, 99 pulse), 10/14 

1900 (118/58 BP), 0830 10/15 (99/53 BP, 122/65 BP, 91 pulse).








- History of Current Complaint


Stated Complaint: LOW BLOOD PRESSURE


Hx Obtained From: Patient, Family/Caretaker - daughter


Onset/Duration: Started Weeks Ago - a month ago, Still Present


Timing: Lasting Weeks - a month ago


Severity: Severe - severity of diarrhea, minimal pain


Current Severity: None - pain denied


Associated Signs and Symptoms: Positive: Dizziness - one episode, none at 

present, Blood w/Stool, Diarrhea, Change in Appetite - decreased, Abdominal 

Pain - when eating and with diarrhea, Other: - positive - generalized weakness, 

weight loss, episode of SOB with none present currently, gaseous.  Negative: 

Vomiting, Fever, Chest Pain


Aggravating Factor(s): Food, Bowel Movement


Alleviating Factor(s): Nothing





- Allergy/Home Medications


Allergies/Adverse Reactions: 


 Allergies











Allergy/AdvReac Type Severity Reaction Status Date / Time


 


Sulfa (Sulfonamide Allergy  See Comment Verified 09/24/19 07:40





Antibiotics)     











Home Medications: 


 Home Medications





Magnesium Oxide TAB* [MagOx 400 TAB*] 400 mg PO DAILY 10/15/19 [History 

Confirmed 10/15/19]


Umeclidinium 62.5 MDI(NF) [Incruse ELLIPTA MDI (NF)] 62.5 mcg INH DAILY 10/15/

19 [History Confirmed 10/15/19]


Valsartan TAB* [Diovan TAB*] 160 mg PO DAILY 10/15/19 [History Confirmed 10/15/

19]











PMH/Surg Hx/FS Hx/Imm Hx


Previously Healthy: No


Endocrine/Hematology History: 


   Denies: Hx Diabetes


Cardiovascular History: Reports: Hx Hypertension


   Denies: Hx Pacemaker/ICD


Respiratory History: Reports: Hx Chronic Obstructive Pulmonary Disease (COPD)


GI History: Reports: Hx Diverticulosis, Hx Gastrointestinal Bleed - guaiac 

positive stool , Other GI Disorders - colitis 


Musculoskeletal History: Reports: Hx Orthopedic Injury - "shattered femur" and 

repair 


Sensory History: Reports: Hx Contacts or Glasses, Hx Hearing Problem


   Denies: Hx Hearing Aid


Opthamlomology History: Reports: Hx Contacts or Glasses


Neurological History: Reports: Hx Dementia


Psychiatric History: 


   Denies: Hx Panic Disorder





- Surgical History


Surgical History: Yes


Surgery Procedure, Year, and Place: RIGHT HIP.  CATARACTS.  RT LEG SURGERY WITH 

NADIRA AND 7 SCREWS.  sigmoid colectomy 2018 [facility].  appendectomy


Infectious Disease History: No





- Family History


Known Family History: Positive: Other - throat cancer father, bone cancer 

brother


   Negative: Cardiac Disease, Diabetes





- Social History


Lives: With Family - daughter


Alcohol Use: None


Hx Substance Use: No


Substance Use Type: Reports: None


Hx Tobacco Use: Yes


Smoking Status (MU): Former Smoker





Review of Systems


Constitutional: Other - positive - weight loss 


Positive: Fatigue - generalized weakness .  Negative: Fever


Negative: Chest Pain


Positive: Shortness Of Breath - one episode within past few days, none at 

present 


Gastrointestinal: Other - positive - decreased appetite, gaseous 


Positive: Abdominal Pain - with diarrhea and food , Diarrhea.  Negative: 

Vomiting


Genitourinary: Negative - no urinary symptoms , Other - positive - blood in 

stool 


Skin: Negative


Neurological: Other - positive - dizziness, one episode within past few days, 

none at present 


Positive: Headache - one episode within past few days, none at present 


Psychological: Normal


All Other Systems Reviewed And Are Negative: Yes





Physical Exam





- Summary


Physical Exam Summary: 


Appearance: Ill-appearing, no pain distress, well-nourished


Skin: Warm, color reflects adequate perfusion, dry


Head: Normal Head/Face inspection, atraumatic


Eyes: Conjunctiva clear


ENT: Normal inspection


Neck: Supple, no nodes, no JVD


Respiratory: Lungs clear, normal breath sounds, no respiratory distress


Cardio: RRR, No murmur, pulses normal, brisk capillary refill


Abdomen: Soft, nontender, no masses, nondistended


Rectal Exam: Patient has green-brown stool that is soft, sample was obtained. 

No masses palpated. Nurse Lionel chaperoned exam. 


Bowel sounds: Present 


Musculoskeletal: Strength Intact/ROM intact, no calf tenderness, no edema.


Psychological: Normal


Neuro: Alert, muscle tone normal, no focal deficit








Triage Information Reviewed: Yes


Vital Signs On Initial Exam: 





 Initial Vitals











Temp Pulse Resp BP Pulse Ox


 


 98.7 F   98   16   158/80   90 


 


 10/15/19 10:02  10/15/19 10:02  10/15/19 10:02  10/15/19 10:02  10/15/19 10:02








Vital Signs Reviewed: Yes





Procedures





- Sedation


Patient Received Moderate/Deep Sedation with Procedure: No





Diagnostics





- Laboratory


Result Diagrams: 


 10/20/19 12:26





 10/20/19 12:26


Lab Statement: Any lab studies that have been ordered have been reviewed, and 

results considered in the medical decision making process.





Re-Evaluation





- Re-Evaluation


  ** First Eval


Re-Evaluation Time: 12:02


Change: Unchanged


Comment: 1202  potassium 2.6 per charge nurse Inez. Will begin replacement IV.





  ** Second Eval


Re-Evaluation Time: 12:23


Change: Improved


Comment: Patient had a bowel movement. She denies pain at present. Daughter 

remains in the room. Stool is observed to be in "loose chunks" with no observed 

blood.





  ** Third Eval


Re-Evaluation Time: 13:04


Change: Unchanged


Comment: Maribell from lab ([id number]) was contacted with regards to stool sample 

report; stool sample results to be changed to accurately reflect positive stool 

occult sample.





GIGU Course/Dx





- Course


Course Of Treatment: Patient is a 85 y/o F [facility] with 

complaints of diarrhea for the past month. Daughter, Lidya, who lives with the 

patient, had made the patient come in today, 10/15/19, for evaluation due to 

hypotension at home. Pt was not hypotensive for EMS or at ED triage. Patient 

states that food will "go right through me". Patient reports that she has had 

blood in her stool previously, for which repeat colonoscopy has been scheduled. 

Patient is being followed by GI at [facility] and colonoscopy is upcoming. Daughter is 

concerned that pt will be too weak to undergo the colonoscopy prep at home. Pt 

denies abdominal pain at present and states that she did not eat this morning. 

However, she notes that she will get abdominal pain after eating food and with 

her diarrhea. Stool is characterized as brown, watery, and with small bits of 

blood. Daughter notes that the patient has had progressively worsening 

generalized weakness. It is noted that the patient has lost around 6 lbs in the 

past month due to decreased appetite and diarrhea.  Abdomen is soft, nontender, 

nondistended and no masses.  Rectal Exam: Patient has green-brown stool that is 

soft, sample was obtained. No masses palpated. Nurse Flowers chaperoned exam.  Dr. Devine's consult from 9/24/19 admission was reviewed. Dr. Devine noted that 

the patient had partial colon resction done in 2008 in [facility]. In 2018

, patient had come to ED with rectal bleed and was transferred to [facility]. Per Nilson's consult note in the ED today, at [facility] pt had "

clipping of a bleeding diverticulum under direct vision" and the right colon 

was not well visualized. Per Dr. Devine communication by phone ischemic 

colitis is possible at this time and that pt does not need antibiotics at this 

time. He advises to keep pt NPO for unprepped colonoscopy today.  Bloodwork was 

obtained. Abnormal values include Hgb 11, Hct 32, MPV 7.1, absolute lymphs 0.7, 

absolute monos 1.1, INR 1.39, potassium 2.6, chloride 99, glucose 104, calcium 

8.1, magnesium 1.7, .94, , total protein 6.3, albumin 3, albumin/

globulin 0.9, amylase 12, lipase < 10. Hemoglobin has decreased from 13.6 to 11 

since 9/24/19. Lactate is 0.8. Stool occult blood test was positive. During ED 

course, patient was given magnesium sulfate 1gm IV, NS IV, potassium chloride 

10 meq IV. 1236 - Patient's case was discussed with Dr. Devine, he will 

consult on case. 1254 Patient's case was discussed with Dr. Arnold, Dr. Arnold 

accepts for admission. Pt and daughter agree with admission.





- Diagnoses


Differential Diagnoses - Female: Colitis, Diverticulitis, Neoplasm


Provider Diagnoses: 


 Diarrhea, Hypokalemia, Hypomagnesemia, Stool guaiac positive








- Physician Notifications


Discussed Care Of Patient With: Hemal Devine


Time Discussed With Above Provider: 12:36


Instructed by Provider To: Other - 1236 - Patient's case was discussed with Dr. Devine, he will consult on case. 1254 - Patient's case was discussed with Dr. Arnold, and Dr. Arnold accepts for admission.





Discharge ED





- Sign-Out/Discharge


Documenting (check all that apply): Patient Departure - admit





- Discharge Plan


Condition: Stable


Disposition: ADMITTED TO [facility]





- Billing Disposition and Condition


Condition: STABLE


Disposition: Admitted to [facility]





- Attestation Statements


Document Initiated by Scribe: Yes


Documenting Scribe: EDUARDO DEWEY


Provider For Whom Scribe is Documenting (Include Credential): GWEN ÁLVAREZ MD


Scribe Attestation: 


EDUARDO NGO, scribed for GWEN ÁLVAREZ MD on 11/01/19 at 1621. 


Scribe Documentation Reviewed: Yes


Provider Attestation: 


The documentation as recorded by the scribEDUARDO fernandez accurately reflects 

the service I personally performed and the decisions made by me, GWEN ÁLVAREZ MD


Status of Scribe Document: Viewed

## 2019-10-15 NOTE — PRO
DATE:  10/15/19 - ROOM #403                REFERRING PHYSICIANS:  Dr. Abraham Roberson and Dr. Nicky Arnold. *

 

PROCEDURE:  Colonoscopy to hepatic flexure.

 

INDICATION:  Diarrhea persistently 6 to 10 times a day without any fever.  
Stool culture was negative 12 days ago with stool positive for lactoferrin and 
blood. The lab report states there was formed stool and C. diff analysis was 
not done.

 

Informed consent was obtained from the patient and preprocedure conversation 
was held with the patient's daughter.  

CT scan shows significant colon thickening throughout slightly sparing the 
rectum.

 

ENDOSCOPIST:  Dr. Devine.          MEDICATIONS:  Midazolam 2, fentanyl 25. 



FINDINGS:  She is an elderly woman with mild abdominal rounding or distention. 
Bowel sounds are normal.  Rectal is patulous with mucopus and some blood 
staining.

 

Initial view does show edematous, erythematous, and granular mucosa in the 
rectum with a splattered exudate in a somewhat polka dot pattern.  The 
appearance is consistent with ulcerative colitis and not Crohn's or C. diff.  
The findings are continuous down to the anal verge.  The findings become more 
severe going into the sigmoid, descending and then transverse.  The hepatic 
flexure was a limited exam at which point a flexure was encountered.  On slow 
withdrawal, aspirate was sent for amoeba and C. diff.  Biopsies were taken from 
the sigmoid.

 

IMPRESSION:

1.  Diverticulosis - in the sigmoid.

2.  Pancolitis - visually consistent with ulcerative colitis and intravenous 
prednisone is suggested.

3.  There is no indication now for antibiotics.                    Addendum: 
inactive chronic colitis

 

[id number] #: [id number]

MTDD

## 2019-10-15 NOTE — HP
CC:  Dr. Roberson *

 

HISTORY AND PHYSICAL:

 

DATE OF ADMISSION:  10/15/19

 

PRIMARY CARE PROVIDER:  Dr. Roberson.

 

CHIEF COMPLIANT:  Diarrhea.

 

HISTORY OF PRESENT ILLNESS:  Ms. Phelan is an 84-year-old female who states that 
for the last 1 month she has had persistent diarrhea.  She notes that the 
frequency is becoming more often.  She states that this week she has been going 
anywhere between 5 to 10 times per day.  Sometimes it is mushy stool, other 
times it is pure watery liquid.  She did see a gastroenterology PA at the end 
of September and at that time she was referred to the emergency room.  During 
that evaluation, she had a normal white blood cell count.  Her hemoglobin was 
normal.  Her potassium was slightly low and her CRP was mildly elevated at 
36.59.  She underwent CT of the abdomen and pelvis at that time which revealed 
diffuse mucosal thickening of the distal colon. The patient followed up with 
Dr. Devine on 10/02/19.  At that time, it was recommended that she have 
colonoscopy.  The colonoscopy has been arranged for 11/13/19.  The patient has 
continued to have such severe diarrhea that she has lost 6 pounds since 10/02/
19.  She has had increased weakness.  She is not eating very much as every time 
she takes anything in by mouth she states it goes right through her.  She has 
not had any fevers at home.  The patient and her family are concerned about 
waiting until 11/13/19 for her colonoscopy as she has become weaker and they 
believe she is getting malnourished.

 

PAST MEDICAL HISTORY:

1.  Hypertension.

2.  COPD.

3.  Vascular dementia.

 

PAST SURGICAL HISTORY:

1.  Partial colectomy for diverticulitis.

2.  Tonsillectomy.

3.  Appendectomy.

4.  Back surgery.

5.  Right total hip arthroplasty.

 

ALLERGIES:  Allergy to SULFA medication. 



MEDICATIONS:

1.  Aspirin 81 mg p.o. daily.

2.  Hydrochlorothiazide 12.5 mg p.o. daily.

3.  Valsartan 160 mg p.o. daily.

4.  Incruse Ellipta 1 puff inhaled daily.

5.  Probiotic 1 cap p.o. daily.

6.  Multivitamin 1 tab p.o. daily.

7.  Magnesium oxide 400 mg p.o. daily.

 

FAMILY HISTORY:  Dad had lung cancer.  Brother had prostate cancer.  Family 
history is negative for CAD.

 

SOCIAL HISTORY:  The patient is a former smoker.  She quit approximately 9 
years ago.  She has at least a 30-pack year history of smoking.  She drinks 
alcohol on occasion.  She is living with her daughter.  Her daughter, Lidya, is 
her healthcare proxy.

 

REVIEW OF SYSTEMS:  A complete 11-system review of systems is obtained, 
pertinent positives and negatives are as per HPI.  In addition, the patient 
does complain of mild abdominal discomfort mostly on the left side.  She does 
not have any significant nausea.  She has no chest pain and no shortness of 
breath.

 

                               PHYSICAL EXAMINATION

 

GENERAL:  The patient is a well-developed elderly female, seem sitting up in 
the stretcher in no acute distress.

 

VITAL SIGNS:  Blood pressure 154/70, pulse 81, respirations 16, temp 98.4, O2 
sat 95% on room air.

 

HEENT:  Pupils are round and equal.  Oropharynx is clear.  Oral mucosa is 
moist. There is no submandibular, cervical, or supraclavicular adenopathy.

 

LUNGS:  Clear to auscultation bilaterally.

 

CARDIAC:  Normal S1 and S2.  Regular rate and rhythm.  I do not appreciate any 
murmur.  There is no lower extremity edema.

 

ABDOMEN:  Bowel sounds are present.  Abdomen is soft, nondistended.  She is 
mildly tender to palpation in the left upper and lower quadrants.

 

MUSCULOSKELETAL:  There is no cyanosis, clubbing of digits.  She has full 
active range of motion of all 4 extremities.

 

SKIN:  Visible areas of skin are warm and dry and are without rash.

 

NEUROLOGIC:  Cranial nerves II to XII are grossly intact.  Sensation is intact 
to light touch throughout.  Strength is normal throughout.  The patient is able 
to get herself off the stretcher onto the commode and back off the commode and 
onto the stretcher without any assistance.

 

PSYCH:  The patient is alert.  She is oriented x3.  Affect appears appropriate.

 

 DIAGNOSTIC STUDIES/LAB DATA:  WBC 7.9, hemoglobin 11.0, hematocrit 32, 
platelets 404.  INR 1.39.  Sodium 136, potassium 2.6, chloride 99, CO2 of 28.  
BUN 12, creatinine 0.67, glucose 104, lactic acid 0.8, calcium 8.1, magnesium 
1.7, bilirubin 0.6.  AST 16, ALT 12, alk phos 67.  Troponin 0.03.  .94.  
Albumin 3.0, amylase 12, lipase less than 10.

 

CT abdomen and pelvis, diffuse colonic thickening throughout the colon remnant 
suspicious for colitis.  There is also cholelithiasis and biliary duct 
dilatation.

 

ASSESSMENT AND PLAN:  Ms. Phelan is an 84-year-old female who has had diarrhea 
for approximately 1 month without any fevers, chills, or significant 
hematochezia, who presents to the emergency room because of the persistent 
diarrhea.  She is being admitted for colitis.

1.  Colitis.  At this point, it appears the entire colon is inflamed.  I will 
admit her and place her on Zosyn, though I think an infectious colitis seems 
very unlikely at this time.  GI consultation with Dr. Devine has been 
requested.  She will have IV fluid hydration with normal saline at 100 mL per 
hour.  The patient's H and H is slightly lower now than it was on 09/25/19.  
Her stool is heme positive.  I will get a followup CBC tomorrow to evaluate for 
worsened anemia.

2.  Hypokalemia, likely secondary to significant diarrhea.  She is receiving 20 
mEq of IV potassium and then will give potassium chloride 40 mEq p.o. x2 doses.
  This is going to be given in liquid form as I suspect the pills will pass 
right through her without being observed.

3.  Hypertension.  The patient's blood pressure is mildly elevated.  She will 
continue on her valsartan, though I am going to hold her for 
hydrochlorothiazide.

4.  Chronic obstructive pulmonary disease.  There are no signs of exacerbation 
at this time.  Continue Incruse Ellipta 1 puff inhaled daily.

5.  DVT prophylaxis.  According to the Adult Thrombosis Prophylaxis Risk Factor 
Assessment Guide, the patient has a total risk factor score of 3 making her 
high risk.  Heparin 5000 units subcutaneous q.8 hours will be utilized for DVT 
prophylaxis as there is no gross hematochezia.

6.  Code status is DNR.

 

TIME SPENT:  Sixty-five minutes was spent admitting this patient.

 

 

 

[id number]/[facility] #: [id number]

[facility]RA

## 2019-10-15 NOTE — CONS
GASTROENTEROLOGY CONSULT:                                                      
  DATE:  10/15/19

 

CONSULTING PHYSICIAN:  Dr. Nicky Arnold.

 

REASON FOR CONSULT:  Diarrhea and crampy abdominal distress with thickening of 
the colon on CT scan and elevation of CRP to 197 with metabolic deterioration 
noting potassium 2.6, albumin 3.0, and hemoglobin 11, which had 3 weeks ago 
been 3.4, 4.1 and 13.6 respectively.

 

HISTORY:  This 84-year-old woman has been having diarrhea.  She says she cannot 
keep anything in her system as it passes right through.  There has been no 
vomiting.  Over the last few days, she has been getting weaker.

 

She has had complaints of loose stools intermittently for several years and had 
bowel cultures done in 2016 and 2017 with C. diff negative then.  Stool has at 
times been lactoferrin positive and heme-positive.  She came to the emergency 
room 3 weeks ago and her evaluation was delayed 24 hours because of emergency 
room crowding.  At that time, she was seen, she said that her stool was 
improving and she was feeling better and her CBC was normal and stool heme-
negative and she was discharged for outpatient assessment.  She did bring a 
stool specimen in on 10/03/19 and it was heme-positive.  Enteric pathogen 
culture was negative.  The lab reported the stool as formed and firm and 
therefore did not do C. diff analysis. The patient denied recent antibiotic use 
and her daughter tends to confirm it.  She did have ceftriaxone back in March 
when she had a urinary infection.  They do not recall any other antibiotics.

 

She had resection of diverticulosis in the sigmoid in 2008 at [facility].  
In March 2018 with GI bleeding, she was sent [facility] and had clipping of a 
bleeding diverticulum.  This was done under direct vision.  The right colon had 
much stool and was not seen.

 

PAST MEDICAL HISTORY:

1.  Vascular dementia - diagnosed by her primary.

2.  COPD - quit smoking in 2010, but still uses inhaler.

3.  Status post appendectomy in 1995.

4.  Status post sigmoid colectomy at [facility] in 2008.

5.  Recurring diarrhea - see above.

6.  Hip fracture - December 2011.

7.  Hypertension.

8.  Urosepsis - March 2019 admission.

 

MEDICATIONS:  As an outpatient are unchanged from 3 weeks ago.

 

FAMILY HISTORY:  Her father had lung cancer.  Mother lived to old age.  Her 
daughter has had a cerebral aneurysm clipped.

 

SOCIAL HISTORY:  She moved to the [facility] recently.  She is a retired 
 and ex smoker. 

 

REVIEW OF SYSTEMS:  No recent history of fever, vomiting, cough, headache, 
visible blood in the stool, MI, syncope, fall or fracture in the last several 
years.

 

EXAM:  She is a slender, somewhat frail-appearing elderly woman, in no overt 
distress.  She is afebrile.  She is anicteric.  Mucous membranes are well 
hydrated. She has no adenopathy.  Lungs show diminished breath sounds 
symmetrically.  Heart sounds are regular.  Breast and Pelvic Exams:  Deferred.  
The abdomen is mildly rounded with normal bowel sounds, soft, but with deep 
tenderness.   Rectal: decreased tone and mucoid exudative liquid 

Extremities show no edema or erythema or joint effusions.  Neurologic is 
nonfocal, quite alert, oriented and expressing appreciation for her daughter's 
care.

 

CT review - thickening of the colon favoring proximal areas and sparing the 
rectum. In retrospect, it may have been subtly seen 3 weeks ago.

 

IMPRESSION:  This 84 year old woman has had mild intermittent diarrhea for 
several years without any systemic toxicity though now presents acutely ill.  
Appearance of diffuse colitis on CT is now clear. The cause unclear as 
antibiotic history suggesting C Diff is absent and bloody passage is unusual 
for that. Unprepped sigmoidoscopy is clearly indicated with collection of 
specimens.

 

 [id number]/[facility] #: [id number]

[facility]

## 2019-10-16 LAB
ANION GAP SERPL CALC-SCNC: 8 MMOL/L (ref 2–11)
BASOPHILS # BLD AUTO: 0 10^3/UL (ref 0–0.2)
BUN SERPL-MCNC: 10 MG/DL (ref 6–24)
BUN/CREAT SERPL: 16.9 (ref 8–20)
CALCIUM SERPL-MCNC: 7.5 MG/DL (ref 8.6–10.3)
CHLORIDE SERPL-SCNC: 105 MMOL/L (ref 101–111)
EOSINOPHIL # BLD AUTO: 0 10^3/UL (ref 0–0.6)
GLUCOSE SERPL-MCNC: 182 MG/DL (ref 70–100)
HCO3 SERPL-SCNC: 23 MMOL/L (ref 22–32)
HCT VFR BLD AUTO: 30 % (ref 35–47)
HGB BLD-MCNC: 10.3 G/DL (ref 12–16)
LYMPHOCYTES # BLD AUTO: 0.4 10^3/UL (ref 1–4.8)
MCH RBC QN AUTO: 29 PG (ref 27–31)
MCHC RBC AUTO-ENTMCNC: 34 G/DL (ref 31–36)
MCV RBC AUTO: 84 FL (ref 80–97)
MONOCYTES # BLD AUTO: 0.2 10^3/UL (ref 0–0.8)
NEUTROPHILS # BLD AUTO: 5.3 10^3/UL (ref 1.5–7.7)
NRBC # BLD AUTO: 0 10^3/UL
NRBC BLD QL AUTO: 0
PLATELET # BLD AUTO: 350 10^3/UL (ref 150–450)
POTASSIUM SERPL-SCNC: 3.7 MMOL/L (ref 3.5–5)
RBC # BLD AUTO: 3.55 10^6 /UL (ref 3.7–4.87)
SODIUM SERPL-SCNC: 136 MMOL/L (ref 135–145)
WBC # BLD AUTO: 5.8 10^3/UL (ref 3.5–10.8)

## 2019-10-16 RX ADMIN — POTASSIUM CHLORIDE SCH MEQ: 20 SOLUTION ORAL at 02:19

## 2019-10-16 RX ADMIN — HEPARIN SODIUM SCH UNITS: 5000 INJECTION INTRAVENOUS; SUBCUTANEOUS at 21:28

## 2019-10-16 RX ADMIN — SODIUM CHLORIDE SCH MLS/HR: 900 IRRIGANT IRRIGATION at 05:30

## 2019-10-16 RX ADMIN — ALBUTEROL SULFATE PRN PUFF: 90 AEROSOL, METERED RESPIRATORY (INHALATION) at 15:40

## 2019-10-16 RX ADMIN — THERA TABS SCH TAB: TAB at 10:11

## 2019-10-16 RX ADMIN — METHYLPREDNISOLONE SODIUM SUCCINATE SCH MG: 40 INJECTION, POWDER, FOR SOLUTION INTRAMUSCULAR; INTRAVENOUS at 14:48

## 2019-10-16 RX ADMIN — VALSARTAN SCH MG: 160 TABLET ORAL at 21:24

## 2019-10-16 RX ADMIN — ASPIRIN SCH MG: 81 TABLET, CHEWABLE ORAL at 10:11

## 2019-10-16 RX ADMIN — HEPARIN SODIUM SCH UNITS: 5000 INJECTION INTRAVENOUS; SUBCUTANEOUS at 05:31

## 2019-10-16 RX ADMIN — METHYLPREDNISOLONE SODIUM SUCCINATE SCH MG: 40 INJECTION, POWDER, FOR SOLUTION INTRAMUSCULAR; INTRAVENOUS at 21:24

## 2019-10-16 RX ADMIN — Medication SCH: at 10:33

## 2019-10-16 RX ADMIN — UMECLIDINIUM SCH PUFF: 62.5 AEROSOL, POWDER ORAL at 08:09

## 2019-10-16 RX ADMIN — HEPARIN SODIUM SCH UNITS: 5000 INJECTION INTRAVENOUS; SUBCUTANEOUS at 14:54

## 2019-10-16 RX ADMIN — METHYLPREDNISOLONE SODIUM SUCCINATE SCH MG: 40 INJECTION, POWDER, FOR SOLUTION INTRAMUSCULAR; INTRAVENOUS at 05:30

## 2019-10-16 NOTE — PN
Subjective


Date of Service: 10/16/19


Interval History: 





Pt continued to have frequent diarrhea overnight. She continues to have mild 

abdominal discomfort. She did not remember Dr. Devine telling her that she 

likely has ulcerative colitis. 





Objective


Active Medications: 








Albuterol (Ventolin Hfa Inhaler*)  2 puff INH Q4H PRN


   PRN Reason: SOB/WHEEZING


   Last Admin: 10/15/19 21:41 Dose:  2 puff


Aspirin (Aspirin 81 Mg Chew Tab*)  81 mg PO DAILY [facility]


Heparin Sodium (Porcine) (Heparin Vial(*))  5,000 units SUBCUT Q8HR [facility]


   Last Admin: 10/16/19 05:31 Dose:  5,000 units


Sodium Chloride (Ns 0.9% 1000 Ml**)  1,000 mls @ 100 mls/hr IV PER RATE [facility]


   Stop: 10/16/19 23:59


   Last Admin: 10/16/19 05:30 Dose:  100 mls/hr


Methylprednisolone Sodium Succinate (Solu-Medrol 40 Mg)  20 mg IV Q8H [facility]


   Last Admin: 10/16/19 05:30 Dose:  20 mg


Multivitamins/Minerals (Theragran/Minerals Tab*)  1 tab PO DAILY [facility]


Nft: L.Acidoph, Paracasei, B.Lactis [Probiotic] 1 Each  1 each PO DAILY [facility]


Umeclidinium Bromide (Incruse Ellipta Mdi (Nf))  1 inh INH DAILY [facility]


Valsartan (Diovan Tab*)  160 mg PO DAILY [facility]








 Vital Signs - 8 hr











  10/16/19 10/16/19





  00:03 03:35


 


Temperature 97.5 F 97.8 F


 


Pulse Rate 92 86


 


Respiratory 18 18





Rate  


 


Blood Pressure 119/45 117/56





(mmHg)  


 


O2 Sat by Pulse 91 94





Oximetry  











Oxygen Devices in Use Now: None


Appearance: Elderly female lying in bed sleeping, awakens to voice, NAD


Eyes: No Scleral Icterus


Ears/Nose/Mouth/Throat: Mucous Membranes Moist


Respiratory: Symmetrical Chest Expansion and Respiratory Effort


Cardiovascular: NL Sounds; No Murmurs; No JVD, RRR, No Edema


Abdominal: - - BS+ soft, ND, mildly tender to light palpation throughout


Extremities: No Clubbing, Cyanosis


Skin: No Nodules or Sclerosis


Neurological: - - slightly confused


Result Diagrams: 


 10/16/19 04:52





 10/16/19 04:52


Microbiology and Other Data: 


 Microbiology











 10/15/19 17:20 Stool Gross Appearance - Final





 Stool C. difficile DNA Amplification - Final





    027 Presumptive NEGATIVE





    Toxigenic C.diff NEGATIVE


 


 10/15/19 12:25 Stool Gross Appearance - Final





 Stool Stool Lactoferrin - Final


 


 10/15/19 10:10 Stool Occult Blood (JEREMY) - Final





 Stool 














Assess/Plan/Problems-Billing


Ms Phelan is an 83 yo F who has a h/o COPD, HTN and vascular dementia who 

presented to the ER with c/o 1 month of diarrhea that was getting progressively 

worse with associated weakness. 











- Patient Problems


(1) Ulcerative colitis


Current Visit: Yes   Status: Acute   Code(s): K51.90 - ULCERATIVE COLITIS, 

UNSPECIFIED, WITHOUT COMPLICATIONS   SNOMED Code(s): 27979720


   Comment: Pt underwent unprepped colonoscopy with Dr. Devine last evening. 

He found pancolitis c/w ulcerative colitis. She was started on solumedrol 40mg 

IV x1 then 20mg q8hr. Will monitor for improvement in diarrhea and ability to 

eat. The patient had significant hypokalemia yesterday- now resolved. Will 

repeat BMP tomorrow to ensure her K stays in appropriate range. Will also 

follow CRP.   





(2) HTN (hypertension)


Current Visit: Yes   Status: Acute   Code(s): I10 - ESSENTIAL (PRIMARY) 

HYPERTENSION   SNOMED Code(s): 16801062


   Comment: BP is under good control on valsartan alone- holding HCTZ while pt 

with profuse diarrhea.    





(3) COPD (chronic obstructive pulmonary disease)


Current Visit: Yes   Status: Acute   Code(s): J44.9 - CHRONIC OBSTRUCTIVE 

PULMONARY DISEASE, UNSPECIFIED   SNOMED Code(s): 81305913


   Comment: No signs of exacerbation. Continue prn albuterol and incruse 

ellipta.    





(4) Dementia


Current Visit: Yes   Status: Acute   Code(s): F03.90 - UNSPECIFIED DEMENTIA 

WITHOUT BEHAVIORAL DISTURBANCE   SNOMED Code(s): 89727494


   Comment: Provide supportive care.    





(5) DVT prophylaxis


Current Visit: Yes   Status: Acute   Code(s): Z29.9 - ENCOUNTER FOR 

PROPHYLACTIC MEASURES, UNSPECIFIED   SNOMED Code(s): 963666384


   Comment: SQ heparin   





(6) DNR (do not resuscitate)


Current Visit: Yes   Status: Acute

## 2019-10-16 NOTE — PN
Progress Note





- Progress Note


Date of Service: 10/16/19


Note: 


GASTROENTEROLOGY FOLLOW-UP NOTE





IE/S:


- Colonoscopy to hepatic flexure yesterday demonstrated moderate to severe 

colitis w/ exudate concerning for ulcerative colitis. Infectious studies have 

been negative. Path from colon biopsies reports only mild inactive chronic 

colitis.


- Patient reports ongoing diarrhea and abdominal pain without significant 

change. Pain seems to improve in the afternoon as she has only mild dull 

discomfort at time of interview at 5 pm. Episode of vomiting earlier in the day.





O:


VSS- AF, not tachycardic, normotensive


GEN: Elderly woman. NAD.


HEENT: MMM


CARDS: RRR


PULM: Breathing comfortably


ABD: +BS. Soft, non-distended, tender diffusely (lower > upper)





Labs reviewed. WBC normal. Stable anemia. K normalized today. .9 

yesterday.


MICRO: Parasite stool study pending. C diff negative.


PATH from colon biopsies: "Mild inactive chronic colitis."





A/P:


84yF w/ vascular dementia, COPD, HTN, and chronic diarrhea, who is admitted w/ 

diarrhea, abdominal pain, and metabolic derangements. 





Flexible sigmoidoscopy to hepatic flexure demonstrated moderate to severe 

colitis w/ exudate. Distribution and appearance suggestive of ulcerative 

colitis. Biopsies from colon are rather surprisingly reported as only mild 

inactive chronic colitis. This pathology read differs from the endoscopic 

appearance of the colon, which appears quite acutely inflamed. Infectious 

studies so far have been negative making infectious mimic of UC less likely. 

Patient has yet to have any symptomatic improvement on Solumedrol, although it 

has been less than 24 hours.





- Continue CBC, CMP, CRP daily


- Monitor bowel movement frequency


- Minimize narcotic use for pain as this can prevent identification of acute 

clinical change in exam (and has been associated with worse outcomes in IBD 

patients)


- Order abdominal x-ray STAT if severe or worsening pain


- Continue Solumedrol 20 mg TID (day 1)


- Can request CMV staining on colon biopsies to rule-out CMV colitis


- Await parasite stool exam


- Continue IV fluids and full liquid diet. Consider lactose-free diet.





Please notify GI if any acute clinical change. GI will continue to follow along.





Suellen Taylor MD


Gastroenterology

## 2019-10-17 LAB
ANION GAP SERPL CALC-SCNC: 5 MMOL/L (ref 2–11)
BUN SERPL-MCNC: 11 MG/DL (ref 6–24)
BUN/CREAT SERPL: 21.2 (ref 8–20)
CALCIUM SERPL-MCNC: 7.6 MG/DL (ref 8.6–10.3)
CHLORIDE SERPL-SCNC: 107 MMOL/L (ref 101–111)
GLUCOSE SERPL-MCNC: 140 MG/DL (ref 70–100)
HCO3 SERPL-SCNC: 25 MMOL/L (ref 22–32)
HCT VFR BLD AUTO: 29 % (ref 35–47)
HGB BLD-MCNC: 10 G/DL (ref 12–16)
MCH RBC QN AUTO: 29 PG (ref 27–31)
MCHC RBC AUTO-ENTMCNC: 34 G/DL (ref 31–36)
MCV RBC AUTO: 85 FL (ref 80–97)
PLATELET # BLD AUTO: 373 10^3/UL (ref 150–450)
POTASSIUM SERPL-SCNC: 3.4 MMOL/L (ref 3.5–5)
RBC # BLD AUTO: 3.48 10^6 /UL (ref 3.7–4.87)
RBC UR QL AUTO: (no result)
SODIUM SERPL-SCNC: 137 MMOL/L (ref 135–145)
VIT C UR QL: (no result)
WBC # BLD AUTO: 7.9 10^3/UL (ref 3.5–10.8)
WBC UR QL AUTO: (no result)

## 2019-10-17 RX ADMIN — METHYLPREDNISOLONE SODIUM SUCCINATE SCH MG: 40 INJECTION, POWDER, FOR SOLUTION INTRAMUSCULAR; INTRAVENOUS at 14:18

## 2019-10-17 RX ADMIN — HEPARIN SODIUM SCH UNITS: 5000 INJECTION INTRAVENOUS; SUBCUTANEOUS at 21:04

## 2019-10-17 RX ADMIN — VALSARTAN SCH MG: 160 TABLET ORAL at 21:02

## 2019-10-17 RX ADMIN — METHYLPREDNISOLONE SODIUM SUCCINATE SCH MG: 40 INJECTION, POWDER, FOR SOLUTION INTRAMUSCULAR; INTRAVENOUS at 21:06

## 2019-10-17 RX ADMIN — ASPIRIN SCH MG: 81 TABLET, CHEWABLE ORAL at 09:12

## 2019-10-17 RX ADMIN — HEPARIN SODIUM SCH UNITS: 5000 INJECTION INTRAVENOUS; SUBCUTANEOUS at 14:19

## 2019-10-17 RX ADMIN — METHYLPREDNISOLONE SODIUM SUCCINATE SCH MG: 40 INJECTION, POWDER, FOR SOLUTION INTRAMUSCULAR; INTRAVENOUS at 05:44

## 2019-10-17 RX ADMIN — THERA TABS SCH TAB: TAB at 09:12

## 2019-10-17 RX ADMIN — HEPARIN SODIUM SCH UNITS: 5000 INJECTION INTRAVENOUS; SUBCUTANEOUS at 05:44

## 2019-10-17 RX ADMIN — Medication SCH: at 09:11

## 2019-10-17 RX ADMIN — UMECLIDINIUM SCH PUFF: 62.5 AEROSOL, POWDER ORAL at 07:36

## 2019-10-17 NOTE — PN
Progress Note





- Progress Note


Date of Service: 10/17/19


Note: 





GI Follow up





Patient seen and examined, felt well this morning but still having diarrhea. 

Pain better. tolerating diet. 7bm. 


No black or blood





VS: 128/61, P-76, R-22, 98% RA


Gen: alert, oriented x3


HEENT: At/nc, perrla, eomi, no jvp


CVS: RRR s1s2


Resp: CTA b/l


Abd: soft, mild ttp diffuse, BS+ 


Ext: no c/c/e





Lab: Hgb - 10


WBC- 7.9


->139





Impression


Colitis on flex sig, inactive on bx


Infectious workup negative





Rec: 


CRP improving, clinically improving


low residue diet


May change to PO steroids when diarrhea slows, would do fairly rapid taper 30,20

,10 q week


Follow up Dr. Devine as outpatient. 





Dimas Dugan DO 10/17/19 [id number]

## 2019-10-17 NOTE — PN
Subjective


Date of Service: 10/17/19


Interval History: 





Pt is generally feeling better today. She has had 2 BMs since the middle of the 

night (less frequent) and her abdominal pain is improved. She wants to get up 

to walk. 





Objective


Active Medications: 








Albuterol (Ventolin Hfa Inhaler*)  2 puff INH Q4H PRN


   PRN Reason: SOB/WHEEZING


   Last Admin: 10/16/19 15:40 Dose:  2 puff


Aspirin (Aspirin 81 Mg Chew Tab*)  81 mg PO DAILY [facility]


   Last Admin: 10/17/19 09:12 Dose:  81 mg


Heparin Sodium (Porcine) (Heparin Vial(*))  5,000 units SUBCUT Q8HR [facility]


   Last Admin: 10/17/19 05:44 Dose:  5,000 units


Methylprednisolone Sodium Succinate (Solu-Medrol 40 Mg)  20 mg IV Q8H [facility]


   Last Admin: 10/17/19 05:44 Dose:  20 mg


Morphine Sulfate (Morphine Inj (Syringe))*)  2 mg IV Q4H PRN


   PRN Reason: PAIN - MILD


Multivitamins/Minerals (Theragran/Minerals Tab*)  1 tab PO DAILY [facility]


   Last Admin: 10/17/19 09:12 Dose:  1 tab


Nft: L.Acidoph, Paracasei, B.Lactis [Probiotic] 1 Each  1 each PO DAILY [facility]


   Last Admin: 10/17/19 09:11 Dose:  Not Given


Umeclidinium Bromide (Incruse Ellipta Mdi (Nf))  1 inh INH DAILY [facility]


   Last Admin: 10/17/19 07:36 Dose:  1 puff


Valsartan (Diovan Tab*)  160 mg PO BEDTIME [facility]


   Last Admin: 10/16/19 21:24 Dose:  160 mg








 Vital Signs - 8 hr











  10/17/19





  04:18


 


Temperature 97.3 F


 


Pulse Rate 83


 


Respiratory 19





Rate 


 


Blood Pressure 119/67





(mmHg) 


 


O2 Sat by Pulse 95





Oximetry 











Oxygen Devices in Use Now: None


Appearance: Elderly female lying in bed, NAD


Eyes: No Scleral Icterus


Ears/Nose/Mouth/Throat: Mucous Membranes Moist


Respiratory: Symmetrical Chest Expansion and Respiratory Effort, Clear to 

Auscultation


Cardiovascular: NL Sounds; No Murmurs; No JVD, RRR, No Edema


Abdominal: NL Sounds; No Tenderness; No Distention


Extremities: No Clubbing, Cyanosis


Skin: No Nodules or Sclerosis


Neurological: Alert and Oriented x 3





- Nutrition: Malnutrition Diagnosis/Plan


Malnutrition Assessment by Registered Dietitian: 


Malnutrition Assessment





Clinical Characteristics         Acute,Severe


Malnutrition Assessment:         Inadequate Oral Intake - Pt's daughter reports


Criteria                         reduced intake on BRAT diet x2 wks; currently 

on


                                 clear liquid diet - anticipate meeting <50%


                                 nutrient needs >5 days (severe)


                                 Unintentional Weight Loss - She reports


                                 unintentional wt loss x2 wks; current wt 125lb,


                                 UBW 130lb x2 wks ago - 3.8% loss x2 wks (severe

)


Malnutrition Assessment:         Nutritional Supplementals/Nourishments - Will


Interventions                    send Apple Ensure Clear (240kcal, 8g prot/serv)


                                 w/ B, L, and D daily to optimize kcal/prot; 

will


                                 monitor acceptance.


                                 GI Related - Recommend giving antidiarrheal PRN

;


                                 will monitor GI s/sx for impact on intake and


                                 ability to advance diet.


                                 Glycemic Control - Will continue to monitor BG/


                                 FS in the setting of steroid med use.


                                 Education - Recommend advancing diet to low


                                 fiber as able; will f/u for nutrition education


                                 as indicated.


Malnutrition Assessment: Goals   1) Recommend advancing diet to low fiber as 

able


                                 2) Adequate po intake to support lean body mass


                                 and hydration status


                                 3) Improve fluid/electrolyte balance w/ 

adequate


                                 po intake and repletion PRN


                                 4) Improve glycemic control w/ adequate po


                                 intake w/o need for dietary restriction in the


                                 setting of steroid med use


                                 5) Maintain bowel regularity w/ adequate po


                                 intake and antidiarrhals PRN w/o exac of


                                 diarrhea/development of constipation








Result Diagrams: 


 10/17/19 05:10





 10/17/19 05:10


Microbiology and Other Data: 


 Microbiology











 10/15/19 17:20 Stool Gross Appearance - Final





 Stool C. difficile DNA Amplification - Final





    027 Presumptive NEGATIVE





    Toxigenic C.diff NEGATIVE


 


 10/15/19 12:25 Stool Gross Appearance - Final





 Stool Stool Lactoferrin - Final


 


 10/15/19 10:10 Stool Occult Blood (JEREMY) - Final





 Stool 














Assess/Plan/Problems-Billing


Ms Phelan is an 85 yo F who has a h/o COPD, HTN and vascular dementia who 

presented to the ER with c/o 1 month of diarrhea that was getting progressively 

worse with associated weakness. 











- Patient Problems


(1) Ulcerative colitis


Current Visit: Yes   Status: Acute   Code(s): K51.90 - ULCERATIVE COLITIS, 

UNSPECIFIED, WITHOUT COMPLICATIONS   SNOMED Code(s): 69149281


   Comment: Pathology showed inactive colitis which does not match the patient'

s clinical picture. Continue solumedrol 20mg IV q8hr. Today she seems to be 

improved. Advance diet. Replace K. Follow stool frequency and abdominal pain.  

  





(2) HTN (hypertension)


Current Visit: Yes   Status: Acute   Code(s): I10 - ESSENTIAL (PRIMARY) 

HYPERTENSION   SNOMED Code(s): 02104785


   Comment: BP is under good control on valsartan alone- continue to hold HCTZ.

   





(3) COPD (chronic obstructive pulmonary disease)


Current Visit: Yes   Status: Acute   Code(s): J44.9 - CHRONIC OBSTRUCTIVE 

PULMONARY DISEASE, UNSPECIFIED   SNOMED Code(s): 20501244


   Comment: No signs of exacerbation. Continue prn albuterol and incruse 

ellipta.    





(4) Dementia


Current Visit: Yes   Status: Acute   Code(s): F03.90 - UNSPECIFIED DEMENTIA 

WITHOUT BEHAVIORAL DISTURBANCE   SNOMED Code(s): 74094275


   Comment: Provide supportive care.    





(5) DVT prophylaxis


Current Visit: Yes   Status: Acute   Code(s): Z29.9 - ENCOUNTER FOR 

PROPHYLACTIC MEASURES, UNSPECIFIED   SNOMED Code(s): 358125392


   Comment: SQ heparin   





(6) DNR (do not resuscitate)


Current Visit: Yes   Status: Acute

## 2019-10-18 RX ADMIN — METHYLPREDNISOLONE SODIUM SUCCINATE SCH MG: 40 INJECTION, POWDER, FOR SOLUTION INTRAMUSCULAR; INTRAVENOUS at 14:23

## 2019-10-18 RX ADMIN — METHYLPREDNISOLONE SODIUM SUCCINATE SCH MG: 40 INJECTION, POWDER, FOR SOLUTION INTRAMUSCULAR; INTRAVENOUS at 21:59

## 2019-10-18 RX ADMIN — Medication SCH: at 10:21

## 2019-10-18 RX ADMIN — HEPARIN SODIUM SCH UNITS: 5000 INJECTION INTRAVENOUS; SUBCUTANEOUS at 05:36

## 2019-10-18 RX ADMIN — METHYLPREDNISOLONE SODIUM SUCCINATE SCH MG: 40 INJECTION, POWDER, FOR SOLUTION INTRAMUSCULAR; INTRAVENOUS at 05:37

## 2019-10-18 RX ADMIN — ASPIRIN SCH MG: 81 TABLET, CHEWABLE ORAL at 10:20

## 2019-10-18 RX ADMIN — VALSARTAN SCH MG: 160 TABLET ORAL at 21:59

## 2019-10-18 RX ADMIN — HEPARIN SODIUM SCH UNITS: 5000 INJECTION INTRAVENOUS; SUBCUTANEOUS at 14:22

## 2019-10-18 RX ADMIN — UMECLIDINIUM SCH PUFF: 62.5 AEROSOL, POWDER ORAL at 07:19

## 2019-10-18 RX ADMIN — HEPARIN SODIUM SCH UNITS: 5000 INJECTION INTRAVENOUS; SUBCUTANEOUS at 22:00

## 2019-10-18 RX ADMIN — THERA TABS SCH TAB: TAB at 10:20

## 2019-10-18 NOTE — PN
Subjective


Date of Service: 10/18/19


Interval History: 





Pt is feeling ok currently. She states this am she had crampy abdominal pain 

and then had a very loose BM. She has only had 2 BMs today. No SOB. She has not 

liked her food. 





Objective


Active Medications: 








Albuterol (Ventolin Hfa Inhaler*)  2 puff INH Q4H PRN


   PRN Reason: SOB/WHEEZING


   Last Admin: 10/16/19 15:40 Dose:  2 puff


Aspirin (Aspirin 81 Mg Chew Tab*)  81 mg PO DAILY [facility]


   Last Admin: 10/18/19 10:20 Dose:  81 mg


Heparin Sodium (Porcine) (Heparin Vial(*))  5,000 units SUBCUT Q8HR [facility]


   Last Admin: 10/18/19 05:36 Dose:  5,000 units


Mesalamine (Mesalamine Dr Cap*)  800 mg PO TID [facility]


   Last Admin: 10/18/19 10:20 Dose:  800 mg


Methylprednisolone Sodium Succinate (Solu-Medrol 40 Mg)  20 mg IV Q8H [facility]


   Last Admin: 10/18/19 05:37 Dose:  20 mg


Morphine Sulfate (Morphine Inj (Syringe))*)  2 mg IV Q4H PRN


   PRN Reason: PAIN - MILD


Multivitamins/Minerals (Theragran/Minerals Tab*)  1 tab PO DAILY [facility]


   Last Admin: 10/18/19 10:20 Dose:  1 tab


Nft: L.Acidoph, Paracasei, B.Lactis [Probiotic] 1 Each  1 each PO DAILY [facility]


   Last Admin: 10/18/19 10:21 Dose:  Not Given


Umeclidinium Bromide (Incruse Ellipta Mdi (Nf))  1 inh INH DAILY [facility]


   Last Admin: 10/18/19 07:19 Dose:  1 puff


Valsartan (Diovan Tab*)  160 mg PO BEDTIME [facility]


   Last Admin: 10/17/19 21:02 Dose:  160 mg








 Vital Signs - 8 hr











  10/18/19 10/18/19 10/18/19





  07:15 07:22 11:15


 


Temperature 97.7 F  97.6 F


 


Pulse Rate 92 94 88


 


Respiratory 17 16 16





Rate   


 


Blood Pressure 143/65  135/63





(mmHg)   


 


O2 Sat by Pulse 96 96 93





Oximetry   











Oxygen Devices in Use Now: None


Appearance: Elderly female sitting up in bed, NAD


Eyes: No Scleral Icterus


Ears/Nose/Mouth/Throat: Mucous Membranes Moist


Respiratory: Symmetrical Chest Expansion and Respiratory Effort, Clear to 

Auscultation - diminished throughout


Cardiovascular: NL Sounds; No Murmurs; No JVD, RRR, No Edema


Abdominal: - - BS+ soft, NT, ND


Extremities: No Clubbing, Cyanosis


Skin: No Nodules or Sclerosis


Neurological: - - alert, mildly confused





- Nutrition: Malnutrition Diagnosis/Plan


Malnutrition Assessment by Registered Dietitian: 


Malnutrition Assessment





Clinical Characteristics         Acute,Severe


Malnutrition Assessment:         Inadequate Oral Intake - Pt's daughter reports


Criteria                         reduced intake on BRAT diet x2 wks; currently 

on


                                 clear liquid diet - anticipate meeting <50%


                                 nutrient needs >5 days (severe)


                                 Unintentional Weight Loss - She reports


                                 unintentional wt loss x2 wks; current wt 125lb,


                                 UBW 130lb x2 wks ago - 3.8% loss x2 wks (severe

)


Malnutrition Assessment:         Nutritional Supplementals/Nourishments - Will


Interventions                    send Apple Ensure Clear (240kcal, 8g prot/serv)


                                 w/ B, L, and D daily to optimize kcal/prot; 

will


                                 monitor acceptance.


                                 GI Related - Recommend giving antidiarrheal PRN

;


                                 will monitor GI s/sx for impact on intake and


                                 ability to advance diet.


                                 Glycemic Control - Will continue to monitor BG/


                                 FS in the setting of steroid med use.


                                 Education - Recommend advancing diet to low


                                 fiber as able; will f/u for nutrition education


                                 as indicated.


Malnutrition Assessment: Goals   1) Recommend advancing diet to low fiber as 

able


                                 2) Adequate po intake to support lean body mass


                                 and hydration status


                                 3) Improve fluid/electrolyte balance w/ 

adequate


                                 po intake and repletion PRN


                                 4) Improve glycemic control w/ adequate po


                                 intake w/o need for dietary restriction in the


                                 setting of steroid med use


                                 5) Maintain bowel regularity w/ adequate po


                                 intake and antidiarrhals PRN w/o exac of


                                 diarrhea/development of constipation








Result Diagrams: 


 10/17/19 05:10





 10/17/19 05:10


Microbiology and Other Data: 


 Microbiology











 10/15/19 17:20 Stool Gross Appearance - Final





 Stool C. difficile DNA Amplification - Final





    027 Presumptive NEGATIVE





    Toxigenic C.diff NEGATIVE


 


 10/15/19 12:25 Stool Gross Appearance - Final





 Stool Stool Lactoferrin - Final


 


 10/15/19 10:10 Stool Occult Blood (JEREMY) - Final





 Stool 














Assess/Plan/Problems-Billing


Ms Phelan is an 85 yo F who has a h/o COPD, HTN and vascular dementia who 

presented to the ER with c/o 1 month of diarrhea that was getting progressively 

worse with associated weakness. 











- Patient Problems


(1) Ulcerative colitis


Current Visit: Yes   Status: Acute   Code(s): K51.90 - ULCERATIVE COLITIS, 

UNSPECIFIED, WITHOUT COMPLICATIONS   SNOMED Code(s): 41667040


   Comment: Continued improvement in diarrhea and abdominal pain. Will continue 

solumedrol through tonight and change to prednisone tomorrow. Rapid taper has 

been recommended by GI. Continue low residue diet.    





(2) HTN (hypertension)


Current Visit: Yes   Status: Acute   Code(s): I10 - ESSENTIAL (PRIMARY) 

HYPERTENSION   SNOMED Code(s): 51081909


   Comment: BP is under good control on valsartan alone- continue to hold HCTZ.

   





(3) COPD (chronic obstructive pulmonary disease)


Current Visit: Yes   Status: Acute   Code(s): J44.9 - CHRONIC OBSTRUCTIVE 

PULMONARY DISEASE, UNSPECIFIED   SNOMED Code(s): 84841834


   Comment: No signs of exacerbation. Continue prn albuterol and incruse 

ellipta.    





(4) Dementia


Current Visit: Yes   Status: Acute   Code(s): F03.90 - UNSPECIFIED DEMENTIA 

WITHOUT BEHAVIORAL DISTURBANCE   SNOMED Code(s): 70942006


   Comment: Provide supportive care.    





(5) DVT prophylaxis


Current Visit: Yes   Status: Acute   Code(s): Z29.9 - ENCOUNTER FOR 

PROPHYLACTIC MEASURES, UNSPECIFIED   SNOMED Code(s): 582984301


   Comment: SQ heparin   





(6) DNR (do not resuscitate)


Current Visit: Yes   Status: Acute

## 2019-10-19 RX ADMIN — HEPARIN SODIUM SCH UNITS: 5000 INJECTION INTRAVENOUS; SUBCUTANEOUS at 13:49

## 2019-10-19 RX ADMIN — HEPARIN SODIUM SCH UNITS: 5000 INJECTION INTRAVENOUS; SUBCUTANEOUS at 06:25

## 2019-10-19 RX ADMIN — ASPIRIN SCH MG: 81 TABLET, CHEWABLE ORAL at 09:04

## 2019-10-19 RX ADMIN — HEPARIN SODIUM SCH UNITS: 5000 INJECTION INTRAVENOUS; SUBCUTANEOUS at 21:55

## 2019-10-19 RX ADMIN — Medication SCH: at 09:07

## 2019-10-19 RX ADMIN — PREDNISONE SCH MG: 10 TABLET ORAL at 09:04

## 2019-10-19 RX ADMIN — CEPHALEXIN SCH MG: 250 CAPSULE ORAL at 09:05

## 2019-10-19 RX ADMIN — VALSARTAN SCH MG: 160 TABLET ORAL at 21:54

## 2019-10-19 RX ADMIN — THERA TABS SCH TAB: TAB at 09:04

## 2019-10-19 RX ADMIN — UMECLIDINIUM SCH PUFF: 62.5 AEROSOL, POWDER ORAL at 08:55

## 2019-10-19 RX ADMIN — CEPHALEXIN SCH MG: 250 CAPSULE ORAL at 21:54

## 2019-10-19 NOTE — PN
Subjective


Date of Service: 10/19/19


Interval History: 





Pt has had 4 loose BM's today, denies BRBPR, denies abd pain





Objective


Active Medications: 








Albuterol (Ventolin Hfa Inhaler*)  2 puff INH Q4H PRN


   PRN Reason: SOB/WHEEZING


   Last Admin: 10/16/19 15:40 Dose:  2 puff


Aspirin (Aspirin 81 Mg Chew Tab*)  81 mg PO DAILY [facility]


   Last Admin: 10/19/19 09:04 Dose:  81 mg


Cephalexin HCl (Keflex Cap*)  250 mg PO BID [facility]


   Last Admin: 10/19/19 09:05 Dose:  250 mg


Heparin Sodium (Porcine) (Heparin Vial(*))  5,000 units SUBCUT Q8HR [facility]


   Last Admin: 10/19/19 06:25 Dose:  5,000 units


Mesalamine (Mesalamine Dr Cap*)  800 mg PO TID [facility]


   Last Admin: 10/19/19 09:04 Dose:  800 mg


Morphine Sulfate (Morphine Inj (Syringe))*)  2 mg IV Q4H PRN


   PRN Reason: PAIN - MILD


Multivitamins/Minerals (Theragran/Minerals Tab*)  1 tab PO DAILY [facility]


   Last Admin: 10/19/19 09:04 Dose:  1 tab


Nft: L.Acidoph, Paracasei, B.Lactis [Probiotic] 1 Each  1 each PO DAILY [facility]


   Last Admin: 10/19/19 09:07 Dose:  Not Given


Prednisone (Deltasone Tab*)  30 mg PO DAILY [facility]


   Last Admin: 10/19/19 09:04 Dose:  30 mg


Umeclidinium Bromide (Incruse Ellipta Mdi (Nf))  1 inh INH DAILY [facility]


   Last Admin: 10/19/19 08:55 Dose:  1 puff


Valsartan (Diovan Tab*)  160 mg PO BEDTIME [facility]


   Last Admin: 10/18/19 21:59 Dose:  160 mg








 Vital Signs - 8 hr











  10/19/19 10/19/19





  07:15 08:00


 


Temperature 97.3 F 


 


Pulse Rate 77 88


 


Respiratory 16 16





Rate  


 


Blood Pressure 163/75 





(mmHg)  


 


O2 Sat by Pulse 90 91





Oximetry  











Oxygen Devices in Use Now: None


Appearance: 83 yo F in nAD, aAOx3


Eyes: No Scleral Icterus, PERRLA


Ears/Nose/Mouth/Throat: NL Teeth, Lips, Gums, Mucous Membranes Moist


Neck: NL Appearance and Movements; NL JVP, Trachea Midline


Respiratory: Symmetrical Chest Expansion and Respiratory Effort, Clear to 

Auscultation, - - mild wheezing on asculation of larynx noted


Cardiovascular: NL Sounds; No Murmurs; No JVD, RRR


Abdominal: NL Sounds; No Tenderness; No Distention, No Hepatosplenomegaly


Lymphatic: No Cervical Adenopathy


Extremities: No Edema


Skin: No Rash or Ulcers, No Nodules or Sclerosis


Neurological: Alert and Oriented x 3, NL Muscle Strength and Tone





- Nutrition: Malnutrition Diagnosis/Plan


Malnutrition Assessment by Registered Dietitian: 


Malnutrition Assessment





Clinical Characteristics         Acute,Severe


Malnutrition Assessment:         Inadequate Oral Intake - Pt's daughter reports


Criteria                         reduced intake on BRAT diet x2 wks; currently 

on


                                 clear liquid diet - anticipate meeting <50%


                                 nutrient needs >5 days (severe)


                                 Unintentional Weight Loss - She reports


                                 unintentional wt loss x2 wks; current wt 125lb,


                                 UBW 130lb x2 wks ago - 3.8% loss x2 wks (severe

)


Malnutrition Assessment:         Nutritional Supplementals/Nourishments - Will


Interventions                    send Apple Ensure Clear (240kcal, 8g prot/serv)


                                 w/ B, L, and D daily to optimize kcal/prot; 

will


                                 monitor acceptance.


                                 GI Related - Recommend giving antidiarrheal PRN

;


                                 will monitor GI s/sx for impact on intake and


                                 ability to advance diet.


                                 Glycemic Control - Will continue to monitor BG/


                                 FS in the setting of steroid med use.


                                 Education - Recommend advancing diet to low


                                 fiber as able; will f/u for nutrition education


                                 as indicated.


Malnutrition Assessment: Goals   1) Recommend advancing diet to low fiber as 

able


                                 2) Adequate po intake to support lean body mass


                                 and hydration status


                                 3) Improve fluid/electrolyte balance w/ 

adequate


                                 po intake and repletion PRN


                                 4) Improve glycemic control w/ adequate po


                                 intake w/o need for dietary restriction in the


                                 setting of steroid med use


                                 5) Maintain bowel regularity w/ adequate po


                                 intake and antidiarrhals PRN w/o exac of


                                 diarrhea/development of constipation








Result Diagrams: 


 10/17/19 05:10





 10/17/19 05:10


Microbiology and Other Data: 


 Microbiology











 10/15/19 17:20 Stool Gross Appearance - Final





 Stool C. difficile DNA Amplification - Final





    027 Presumptive NEGATIVE





    Toxigenic C.diff NEGATIVE


 


 10/15/19 12:25 Stool Gross Appearance - Final





 Stool Stool Lactoferrin - Final


 


 10/15/19 10:10 Stool Occult Blood (JEREMY) - Final





 Stool 














Assess/Plan/Problems-Billing


Ms Phelan is an 83 yo F who has a h/o COPD, HTN and vascular dementia who 

presented to the ER with c/o 1 month of diarrhea that was getting progressively 

worse with associated weakness. 











- Patient Problems


(1) COPD (chronic obstructive pulmonary disease)


Comment: mild laryngospasm on eval today, chronic after ambulation -as per pt.


Continue prn albuterol and incruse ellipta.    





(2) HTN (hypertension)


Comment: BP is under good control on valsartan alone- continue to hold HCTZ.   





(3) Ulcerative colitis


Comment: still  diarrhea today, but no  abdominal pain. Will continue 

prednisone . Rapid taper has been recommended by GI. Continue low residue diet.

    





(4) UTI (urinary tract infection)


Comment: urine cx positive for E. coli.


Keflex started on 10/18


Plan for 5 days of tx   





(5) DVT prophylaxis


Comment: SQ heparin   


Status and Disposition: 


inpatient

## 2019-10-20 VITALS — SYSTOLIC BLOOD PRESSURE: 130 MMHG | DIASTOLIC BLOOD PRESSURE: 60 MMHG

## 2019-10-20 LAB
ANION GAP SERPL CALC-SCNC: 8 MMOL/L (ref 2–11)
BASOPHILS # BLD AUTO: 0 10^3/UL (ref 0–0.2)
BUN SERPL-MCNC: 19 MG/DL (ref 6–24)
BUN/CREAT SERPL: 31.7 (ref 8–20)
CALCIUM SERPL-MCNC: 8.3 MG/DL (ref 8.6–10.3)
CHLORIDE SERPL-SCNC: 105 MMOL/L (ref 101–111)
EOSINOPHIL # BLD AUTO: 0 10^3/UL (ref 0–0.6)
GLUCOSE SERPL-MCNC: 124 MG/DL (ref 70–100)
HCO3 SERPL-SCNC: 23 MMOL/L (ref 22–32)
HCT VFR BLD AUTO: 36 % (ref 35–47)
HGB BLD-MCNC: 12.1 G/DL (ref 12–16)
LYMPHOCYTES # BLD AUTO: 0.6 10^3/UL (ref 1–4.8)
MAGNESIUM SERPL-MCNC: 1.9 MG/DL (ref 1.9–2.7)
MCH RBC QN AUTO: 28 PG (ref 27–31)
MCHC RBC AUTO-ENTMCNC: 34 G/DL (ref 31–36)
MCV RBC AUTO: 84 FL (ref 80–97)
MONOCYTES # BLD AUTO: 0.4 10^3/UL (ref 0–0.8)
NEUTROPHILS # BLD AUTO: 5.7 10^3/UL (ref 1.5–7.7)
NRBC # BLD AUTO: 0 10^3/UL
NRBC BLD QL AUTO: 0
PLATELET # BLD AUTO: 479 10^3/UL (ref 150–450)
POTASSIUM SERPL-SCNC: 3.7 MMOL/L (ref 3.5–5)
RBC # BLD AUTO: 4.28 10^6 /UL (ref 3.7–4.87)
SODIUM SERPL-SCNC: 136 MMOL/L (ref 135–145)
WBC # BLD AUTO: 6.7 10^3/UL (ref 3.5–10.8)

## 2019-10-20 RX ADMIN — PREDNISONE SCH MG: 10 TABLET ORAL at 07:51

## 2019-10-20 RX ADMIN — HEPARIN SODIUM SCH UNITS: 5000 INJECTION INTRAVENOUS; SUBCUTANEOUS at 13:27

## 2019-10-20 RX ADMIN — HEPARIN SODIUM SCH UNITS: 5000 INJECTION INTRAVENOUS; SUBCUTANEOUS at 07:36

## 2019-10-20 RX ADMIN — Medication SCH: at 07:52

## 2019-10-20 RX ADMIN — CEPHALEXIN SCH MG: 250 CAPSULE ORAL at 07:51

## 2019-10-20 RX ADMIN — UMECLIDINIUM SCH PUFF: 62.5 AEROSOL, POWDER ORAL at 07:48

## 2019-10-20 RX ADMIN — ASPIRIN SCH MG: 81 TABLET, CHEWABLE ORAL at 07:51

## 2019-10-20 RX ADMIN — THERA TABS SCH TAB: TAB at 07:51

## 2019-10-21 NOTE — DS
CC:  Dr. Roberson *

 

DISCHARGE SUMMARY:

 

DATE OF ADMISSION:  10/15/19

 

DATE OF DISCHARGE:  10/20/19

 

PROVIDER:  Brigid Salgado NP.

 

ATTENDING PHYSICIAN:  Dr. Worthy.* (DICTATED BY BRIGID SALGADO NP)

 

PRIMARY CARE PHYSICIAN:  Dr. Roberson.

 

CONSULTING PHYSICIAN:  Dr. Devine.

 

PRIMARY DIAGNOSES:

1.  Ulcerative colitis.

2.  Urinary tract infection.

 

SECONDARY DIAGNOSES:

1.  Chronic obstructive pulmonary disease.

2.  Hypertension.

 

PROCEDURES:  The colonoscopy revealed diverticulosis in the sigmoid colon and 
pancolitis visually consistent with ulcerative colitis and intravenous 
prednisone suggested, but no indication for any antibiotics.

 

DIAGNOSTIC STUDIES/LAB DATA:  Studies included an abdomen and pelvis CT scan, 
which showed diffuse colonic thickening throughout the colon, remnant suspicion 
for colitis, and cholelithiasis was found without biliary duct dilatation.

 

Pertinent Labs:  Platelet count of 479.  BUN and creatinine ratio of 31.7, 
glucose 124, calcium 8.3, C-reactive protein 139.36.  Urine was positive for +1 
protein, trace leukocyte esterase, 3+ white blood cell count, 1+ rbc's, 1+ 
urine bacteriemia, present hyaline casts and ascorbic acid.

 

HOSPITAL COURSE:  This is an 84-year-old female with a past medical history 
significant for COPD, hypertension, and vascular dementia who was admitted on 10
/15/19 after having persistent diarrhea of about 5 to 10 stools a day for the 
past month.  Hemoccult was positive.  Dr. Devine was consulted and she 
underwent colonoscopy on 10/15/19, which revealed the pancolitis, which was 
suggestive of ulcerative colitis and diverticulosis of the sigmoid colon.  The 
patient was medically managed with IV fluids and prednisone.  During the course 
of stay, the patient was also found to have had a urinary tract infection that 
was positive for E. coli and the patient was placed on cephalexin 250 mg p.o. 
b.i.d. for 5 days.  On the day of discharge, the patient denied any chest pain, 
shortness of breath, abdominal pain, nausea, vomiting, though she had 2 loose 
bowel movements that morning.  She stated that otherwise she felt well and was 
ready to go home.

 

REVIEW OF SYSTEMS:  An 11-point system review was completed, which was positive 
for loose stools.

 

PHYSICAL EXAMINATION:  Vital Signs:  98.3 temperature, 78 pulse, 18 respirations
, 93% oxygen on room air, and 130/60 blood pressure.  General:  This is a well- 
developed older woman, seen sitting up in bed, in no acute distress.  Eyes: 
Conjunctivae pink and moist.  EOMs intact.  PERRLA.  ENT:  Oropharynx is clear. 
Mucous membranes slightly dry.  Neck:  Supple.  Cardiac:  S1, S2 present.  
Heart rate regular.  No murmurs, gallops, or rubs appreciated.  Pulmonary:  
Lung sounds diminished at bases; otherwise, clear throughout bilaterally on 
room air.  Abdomen was soft, nondistended, diffusely tender throughout.  
Normoactive bowel sounds x4. Musculoskeletal:  No clubbing or cyanosis of digits
, able to move all extremities. Neurologic:  No focal deficits appreciated.  
Sensation intact to light touch throughout.  Strength 5/5 to bilateral upper 
and lower extremities.  Skin:  No open areas appreciated.

 

DISCHARGE PLAN:

1.  Ulcerative colitis.  The patient is to do diet as tolerated, stick with 
bland non-spicy foods for the next week, preferably soft texture.  To continue 
mesalamine 800 mg t.i.d. for a total of 6 weeks, though was only able to order 
1 month's worth of medication.  Also, to continue prednisone taper starting 
with 30 mg to continue over the next 5 days and then decrease it by 10 mg every 
7 days down to 0.

2.  UTI.  The patient is to stay hydrated, drink extra fluids, and take Keflex 
twice a day for the next 7 days.

3.  Hypertension.  Continue the valsartan.

4.  COPD.  Continue the Ellipta.

 

DISCHARGE INSTRUCTIONS:  The patient is to return to the hospital if she 
notices any blood in her stools or any increased nausea, vomiting, abdominal 
pain, or worsening of diarrhea.

 

CONTINUED MEDICATIONS:

1.  Aspirin 81 mg p.o. daily.

2.  Cephalexin 250 mg p.o. b.i.d. for 7 days.

3.  Lactobacillus 1 tablet p.o. daily.

4.  Magnesium oxide 400 mg p.o. daily.

5.  Mesalamine 800 mg p.o. t.i.d. x6 weeks.

6.  Multivitamin 1 tab p.o. daily.

7.  Prednisone 30 mg p.o. daily times the next 5 days and then taper down by 10 
mg every 7 days until down to 0.

8.  Ellipta 62.5 mcg 1 inhalation daily.

9.  Valsartan 160 mg p.o. daily.

 

CONDITION UPON DISCHARGE:  Fair.

 

DISPOSITION:  To home.

 

TIME SPENT:  Time spent on this patient is greater than 30 minutes.



_______________________________ BRIGID SALGADO, LANIE

 

 [id number]/[facility] #: [id number]

[id number]